# Patient Record
Sex: MALE | Race: WHITE | Employment: OTHER | URBAN - METROPOLITAN AREA
[De-identification: names, ages, dates, MRNs, and addresses within clinical notes are randomized per-mention and may not be internally consistent; named-entity substitution may affect disease eponyms.]

---

## 2020-03-27 ENCOUNTER — HOSPITAL ENCOUNTER (OUTPATIENT)
Dept: WOUND CARE | Age: 85
Discharge: HOME | End: 2020-03-27
Payer: MEDICARE

## 2020-03-27 VITALS
BODY MASS INDEX: 20.73 KG/M2 | HEIGHT: 66 IN | TEMPERATURE: 97.4 F | HEART RATE: 66 BPM | DIASTOLIC BLOOD PRESSURE: 71 MMHG | SYSTOLIC BLOOD PRESSURE: 128 MMHG | RESPIRATION RATE: 16 BRPM | WEIGHT: 129 LBS

## 2020-03-27 PROBLEM — L97.919 IDIOPATHIC CHRONIC VENOUS HYPERTENSION OF RIGHT LOWER EXTREMITY WITH ULCER (HCC): Status: ACTIVE | Noted: 2020-03-27

## 2020-03-27 PROBLEM — I87.311 IDIOPATHIC CHRONIC VENOUS HYPERTENSION OF RIGHT LOWER EXTREMITY WITH ULCER (HCC): Status: ACTIVE | Noted: 2020-03-27

## 2020-03-27 PROBLEM — R60.9 EDEMA: Status: ACTIVE | Noted: 2020-03-27

## 2020-03-27 PROCEDURE — 11042 DBRDMT SUBQ TIS 1ST 20SQCM/<: CPT

## 2020-03-27 PROCEDURE — 29581 APPL MULTLAYER CMPRN SYS LEG: CPT

## 2020-03-27 PROCEDURE — 11042 DBRDMT SUBQ TIS 1ST 20SQCM/<: CPT | Performed by: SPECIALIST

## 2020-03-27 PROCEDURE — 99203 OFFICE O/P NEW LOW 30 MIN: CPT | Performed by: SPECIALIST

## 2020-03-27 RX ORDER — GUAIFENESIN 600 MG/1
1200 TABLET, EXTENDED RELEASE ORAL 2 TIMES DAILY
COMMUNITY

## 2020-03-27 RX ORDER — SPIRONOLACTONE 25 MG/1
25 TABLET ORAL DAILY
COMMUNITY

## 2020-03-27 RX ORDER — LEVOTHYROXINE SODIUM 0.05 MG/1
50 TABLET ORAL DAILY
COMMUNITY

## 2020-03-27 RX ORDER — SIMVASTATIN 40 MG
40 TABLET ORAL NIGHTLY
COMMUNITY

## 2020-03-27 RX ORDER — VITAMIN E 268 MG
400 CAPSULE ORAL DAILY
COMMUNITY

## 2020-03-27 RX ORDER — DOCUSATE SODIUM 100 MG/1
100 CAPSULE, LIQUID FILLED ORAL 2 TIMES DAILY
COMMUNITY

## 2020-03-27 RX ORDER — FUROSEMIDE 40 MG/1
40 TABLET ORAL DAILY
COMMUNITY

## 2020-03-27 RX ORDER — ASCORBIC ACID 500 MG
500 TABLET ORAL DAILY
COMMUNITY

## 2020-03-27 RX ORDER — M-VIT,TX,IRON,MINS/CALC/FOLIC 27MG-0.4MG
1 TABLET ORAL DAILY
COMMUNITY

## 2020-03-27 RX ORDER — CLOPIDOGREL BISULFATE 75 MG/1
75 TABLET ORAL DAILY
COMMUNITY

## 2020-03-27 NOTE — PROGRESS NOTES
[address] [facility] - [place], [place]  Progress Note and Procedure Note      Liset Vincent  MEDICAL RECORD NUMBER:  [id number]  AGE: 80 y.o. GENDER: male  : 1926  EPISODE DATE:  3/27/2020      Subjective:     Chief Complaint   Patient presents with    Wound Check     right leg         HISTORY of PRESENT ILLNESS HPI     Liset Vincent is a 80 y.o. male who presents today for wound evaluation. History of Wound Context: patient referred from urgent care for wound right ankle. He woke up noticing wound several weeks ago. However, there is documentation that this wound was identified by PCP in 2019 but unclear as to how it was addressed. He denies any pain or drainage from wound. He was putting xeroform from urgent care onto wound. Patient has neuropathy both feet. Also on anticoagulants for aortic valve replacement    Wound Pain Timing/Severity: none  Quality of pain: N/A  Severity:  0 / 10   Modifying Factors: None  Associated Signs/Symptoms: edema and numbness    Wound Identification:  Wound Type: venous  Contributing Factors: edema, venous stasis and anticoagulation therapy        PAST MEDICAL HISTORY        Diagnosis Date    CAD (coronary artery disease)     Hypertension     Peripheral neuropathy     Thyroid disease        PAST SURGICAL HISTORY    Past Surgical History:   Procedure Laterality Date    CARDIAC SURGERY      heart valve       FAMILY HISTORY    History reviewed. No pertinent family history.     SOCIAL HISTORY    Social History     Tobacco Use    Smoking status: Never Smoker    Smokeless tobacco: Never Used   Substance Use Topics    Alcohol use: Yes     Comment: social    Drug use: Never       ALLERGIES    Allergies   Allergen Reactions    Sulfa Antibiotics Hives and Rash       MEDICATIONS    Current Outpatient Medications on File Prior to Encounter   Medication Sig Dispense Refill    apixaban (ELIQUIS) 2.5 MG TABS tablet Take by mouth 2 times daily      ascorbic acid (VITAMIN fibrous wound lateral right ankle. 1+ PT, DP, femoral RLE, ELENA .9          Assessment:     There is no problem list on file for this patient. Procedure Note  Indications:  Based on my examination of this patient's wound(s) today, sharp excision is required to promote healing and evaluate the extent healing. Performed by: Lisa Saavedra MD    Consent obtained: Yes    Time out taken:  Yes    Pain Control: Anesthetic  Anesthetic: 4% Lidocaine Liquid Topical       Debridement:Excisional Debridement    Using curette the wound(s) was/were sharply debrided down through and including the removal of epidermis, dermis and subcutaneous tissue.         Devitalized Tissue Debrided:  fibrin, biofilm and slough    Pre Debridement Measurements:  Are located in the Wound Documentation Flow Sheet    Wound #: 1     Post  Debridement Measurements:  Wound 03/27/20 Leg Right;Lateral #1 (Active)   Wound Image   3/27/2020 11:37 AM   Wound Venous 3/27/2020 11:37 AM   Dressing/Treatment Collagen with Ag 3/27/2020 12:03 PM   Wound Cleansed Rinsed/Irrigated with saline 3/27/2020 11:37 AM   Wound Length (cm) 1.7 cm 3/27/2020 11:37 AM   Wound Width (cm) 1 cm 3/27/2020 11:37 AM   Wound Depth (cm) 0.1 cm 3/27/2020 11:37 AM   Wound Surface Area (cm^2) 1.7 cm^2 3/27/2020 11:37 AM   Wound Volume (cm^3) 0.17 cm^3 3/27/2020 11:37 AM   Post-Procedure Length (cm) 1.8 cm 3/27/2020 12:03 PM   Post-Procedure Width (cm) 1.1 cm 3/27/2020 12:03 PM   Post-Procedure Depth (cm) 0.3 cm 3/27/2020 12:03 PM   Post-Procedure Surface Area (cm^2) 1.98 cm^2 3/27/2020 12:03 PM   Post-Procedure Volume (cm^3) 0.59 cm^3 3/27/2020 12:03 PM   Wound Assessment Slough;Red;Yellow 3/27/2020 11:37 AM   Drainage Amount Small 3/27/2020 11:37 AM   Drainage Description Yellow 3/27/2020 11:37 AM   Odor None 3/27/2020 11:37 AM   Margins Defined edges 3/27/2020 11:37 AM   Shira-wound Assessment Dry 3/27/2020 11:37 AM   Non-staged Wound Description Full thickness 3/27/2020 11:37 [] Apply around the wound: [] moisturizing lotion [] Antifungal ointment [] No-Sting barrier film [] Zinc paste [] Other:  [] Apply to affected limb:          Dressings:           Wound Location: right lower leg wound      [x] Apply Primary Dressing to wound:       [] Foam/Foam with Border(i.e Mepilex) [] Non-adherent (i.e.Mepitel)   [] Alginate with Silver (i.e. Silvercel)  [] Alginate (i.e. Aquacel)   [] Collagen (i.e. Puracol)   [x] Collagen with Silver(i.e. Catalina)     [] Hydrocolloid    [] Hydrafera Blue moistened with saline   [] MediHoney Paste/Gel   [] Hydrogel    [] Endoform      [] Santyl covered with gauze moisten with saline      [] Betadine   [] Bactroban/Mupirocin   [] Polysporin/Neosporin  [] Other:    [] Saline/Vashe \"wet to dry\" gauze     Pack wound loosely with: [] Iodoform   [] Plain Packing  [] Saline \"wet to dry\"      [x] Cover and Secure with:     [x] Dry Gauze  [] ABD  [] Cast padding  [] Kerlix or Jessica rolled gauze [] Super Absorbent (i.e. Optilock)     [] Coban  [] Ace Wrap [] Ace Wrap from forefoot to just below the knee  [] Paper Tape [] Medipore Tape [] Other:    Avoid contact of tape with skin if possible. [x] Change dressing: [] Daily    [] Every Other Day  [] Three times per week:  [] Monday, Wednesday, Friday  [] Tuesday, Thursday, Saturday   [] Once a week  [x] Do Not Change Dressing [] Other:      Multilayer Compression Wrap:  Type: 2 Layer Lite compression wrap      Applied in Clinic to the :  Right lower leg(s) on 3/27/2020    · Do not get leg(s) with wrap wet. · If wraps become too tight call the center or completely remove the wrap. · Elevate leg(s) above the level of the heart when sitting. · Avoid prolonged standing in one place. [] [address] [facility] remove wrap, cleanse affected leg(s) with soap and water, apply wound dressings as ordered above and reapply compression wraps every:        Dietary:  Important dietary reminders:  1.  Increase Protein intake (i.e. Lean meats, fish, eggs, legumes, and yogurt)  2. No added salt  3. If diabetic, follow a diabetic diet and check glucose prior to meals or as instructed by your physician. If you are still having pain after you go home:   For wounds on lower legs or arms, elevate the affected limb.  Use over-the-counter medications you would normally use for pain as permitted by your family doctor.  For persistent pain not relieved by the above interventions, please call your family doctor. Return Appointment:   DME/Wound Dressing Supplies Provided by:   o (Please call them directly to reorder supplies when you run out)   ECF or Home Healthcare:    Wound reassessment visit with Nurse :   [facility] Return Appointment: With Dr. Cornelius Ontiveros   in  [address] [place] [address]). [x] Orders placed during your visit: No orders of the defined types were placed in this encounter. Your nurse  is:  Raeann     Electronically signed by Renato Molina RN on 3/27/2020 at 12:07 PM     [facility] Information: Should you experience any significant changes in your wound(s) or have questions about your wound care, please contact the [address] [place] [address] at [phone]. Our hours vary so please leave a message. Please give us 24-48 hours to return your call. Call your doctor now or seek immediate medical care if:    · You have symptoms of infection, such as:  ? Increased pain, swelling, warmth, or redness. ? Red streaks leading from the area. ? Pus draining from the area. ? A fever. PLEASE NOTE: IF YOU ARE UNABLE TO OBTAIN WOUND SUPPLIES, CONTINUE TO USE THE SUPPLIES YOU HAVE AVAILABLE UNTIL YOU ARE ABLE TO REACH US. IT IS MOST IMPORTANT TO KEEP THE WOUND COVERED AT ALL TIMES.     Physician Signature:_______________________    Date: ___________ Time:  ____________     Physician: Dr. Cornelius Ontiveros      [] Patient unable to sign Discharge Instructions given to

## 2020-04-03 ENCOUNTER — HOSPITAL ENCOUNTER (OUTPATIENT)
Dept: WOUND CARE | Age: 85
Discharge: HOME | End: 2020-04-03
Payer: MEDICARE

## 2020-04-03 VITALS
DIASTOLIC BLOOD PRESSURE: 66 MMHG | HEART RATE: 68 BPM | TEMPERATURE: 97.6 F | SYSTOLIC BLOOD PRESSURE: 157 MMHG | RESPIRATION RATE: 18 BRPM

## 2020-04-03 PROCEDURE — 11042 DBRDMT SUBQ TIS 1ST 20SQCM/<: CPT

## 2020-04-03 PROCEDURE — 11042 DBRDMT SUBQ TIS 1ST 20SQCM/<: CPT | Performed by: SPECIALIST

## 2020-04-03 PROCEDURE — 29581 APPL MULTLAYER CMPRN SYS LEG: CPT

## 2020-04-03 PROCEDURE — 6370000000 HC RX 637 (ALT 250 FOR IP): Performed by: SPECIALIST

## 2020-04-03 RX ORDER — LIDOCAINE HYDROCHLORIDE 40 MG/ML
2.5 SOLUTION TOPICAL ONCE
Status: COMPLETED | OUTPATIENT
Start: 2020-04-03 | End: 2020-04-03

## 2020-04-03 RX ADMIN — LIDOCAINE HYDROCHLORIDE 2.5 ML: 40 SOLUTION TOPICAL at 11:14

## 2020-04-03 NOTE — PROGRESS NOTES
[address] [facility]  Progress Note and Procedure Note      Carl Juarez  MEDICAL RECORD NUMBER:  [id number]  AGE: 80 y.o. GENDER: male  : 1926  EPISODE DATE:  4/3/2020    Subjective:     Chief Complaint   Patient presents with    Wound Check     right lower leg         HISTORY of PRESENT ILLNESS HPI     Carl Juarez is a 80 y.o. male who presents today for wound/ulcer evaluation. History of Wound Context: patient referred from urgent care for wound right ankle. He woke up noticing wound several weeks ago. However, there is documentation that this wound was identified by PCP in 2019 but unclear as to how it was addressed. He denies any pain or drainage from wound. He was putting xeroform from urgent care onto wound. Patient has neuropathy both feet. Also on anticoagulants for aortic valve replacement . He has tolerated coban compression wrap. Wound Pain Timing/Severity: none    Pain Assessment:  Wound/Ulcer Pain Timing/Severity: none  Quality of pain: N/A  Severity:  0 / 10   Modifying Factors: None  Associated Signs/Symptoms: edema and numbness    Ulcer Identification:  Ulcer Type: venous  Contributing Factors: edema, venous stasis and anticoagulation therapy    Objective:      BP (!) 157/66   Pulse 68   Temp 97.6 °F (36.4 °C) (Oral)   Resp 18     Wt Readings from Last 3 Encounters:   20 129 lb (58.5 kg)       PHYSICAL EXAM    Extremities: no cyanosis, no clubbing, minimal  edema-  right lower extremity and ,    Assessment:      No diagnosis found. Procedure Note  Indications:  Based on my examination of this patient's wound(s) today, sharp excision is required to promote healing and evaluate the extent healing. Performed by: Joel Cason MD    Consent obtained?  Yes    Time out taken: Yes    Pain Control: Anesthetic: 4% Lidocaine Liquid Topical     Debridement:Excisional Debridement    Using curette the wound was sharply debrided    down through and Location: right lower leg wound     [x] Apply Primary Dressing to wound:       [] Foam/Foam with Border(i.e Mepilex) [] Non-adherent (i.e.Mepitel)   [] Alginate with Silver (i.e. Silvercel) [] Alginate (i.e. Aquacel)   [] Collagen (i.e. Puracol)   [] Collagen with Silver(i.e. Catalina)     [] Hydrocolloid    [] Hydrafera Blue moistened with saline   [] MediHoney Paste/Gel   [] Hydrogel    [] Endoform      [] Santyl covered with gauze moisten with saline     [] Betadine   [] Bactroban/Mupirocin   [] Polysporin/Neosporin  [] Other:    [] Saline/Vashe \"wet to dry\" gauze     Pack wound loosely with: [] Iodoform   [] Plain Packing  [] Other:      [x] Cover and Secure with:     [] Dry Gauze  [] ABD [] Cast padding [] Kerlix or Jessica rolled gauze [] Super Absorbent (i.e. Leotha Doan)     [] Coban  [] Ace Wrap [] Ace Wrap from forefoot to just below the knee [] Paper Tape [] Medipore Tape [] Other:    Avoid contact of tape with skin if possible. [x] Change dressing: [] Daily    [] Every Other Day  [] Three times per week:  [] Monday, Wednesday, Friday  [] Tuesday, Thursday, Saturday   [] Once a week  [] Do Not Change Dressing [] Other:         Multilayer Compression Wrap:  Type: 2 Layer Lite compression wrap      Applied in Clinic to the :  Right lower leg(s) on 4/3/2020    · Do not get leg(s) with wrap wet. · If wraps become too tight call the center or completely remove the wrap. · Elevate leg(s) above the level of the heart when sitting. · Avoid prolonged standing in one place. [] [facility] remove wrap, cleanse affected leg(s) with soap and water, apply wound dressings as ordered above and reapply compression wraps every:       Dietary:  Important dietary reminders:  1. Increase Protein intake (i.e. Lean meats, fish, eggs, legumes, and yogurt)  2. No added salt  3. If diabetic, follow a diabetic diet and check glucose prior to meals or as instructed by your physician.       If you are still

## 2020-04-13 ENCOUNTER — HOSPITAL ENCOUNTER (OUTPATIENT)
Dept: WOUND CARE | Age: 85
Discharge: HOME | End: 2020-04-13
Payer: MEDICARE

## 2020-04-13 VITALS
WEIGHT: 141.31 LBS | TEMPERATURE: 98 F | DIASTOLIC BLOOD PRESSURE: 57 MMHG | HEIGHT: 66 IN | HEART RATE: 73 BPM | SYSTOLIC BLOOD PRESSURE: 135 MMHG | BODY MASS INDEX: 22.71 KG/M2 | RESPIRATION RATE: 18 BRPM

## 2020-04-13 PROCEDURE — 99202 OFFICE O/P NEW SF 15 MIN: CPT

## 2020-04-13 PROCEDURE — 99203 OFFICE O/P NEW LOW 30 MIN: CPT | Performed by: SURGERY

## 2020-04-13 PROCEDURE — 11042 DBRDMT SUBQ TIS 1ST 20SQCM/<: CPT | Performed by: SURGERY

## 2020-04-13 PROCEDURE — 11042 DBRDMT SUBQ TIS 1ST 20SQCM/<: CPT

## 2020-04-13 RX ORDER — LIDOCAINE 50 MG/G
OINTMENT TOPICAL ONCE
Status: CANCELLED | OUTPATIENT
Start: 2020-04-13

## 2020-04-13 RX ORDER — GENTAMICIN SULFATE 1 MG/G
OINTMENT TOPICAL ONCE
Status: CANCELLED | OUTPATIENT
Start: 2020-04-13

## 2020-04-13 RX ORDER — BETAMETHASONE DIPROPIONATE 0.05 %
OINTMENT (GRAM) TOPICAL ONCE
Status: CANCELLED | OUTPATIENT
Start: 2020-04-13

## 2020-04-13 RX ORDER — GINSENG 100 MG
CAPSULE ORAL ONCE
Status: CANCELLED | OUTPATIENT
Start: 2020-04-13

## 2020-04-13 RX ORDER — BACITRACIN ZINC AND POLYMYXIN B SULFATE 500; 1000 [USP'U]/G; [USP'U]/G
OINTMENT TOPICAL ONCE
Status: CANCELLED | OUTPATIENT
Start: 2020-04-13

## 2020-04-13 RX ORDER — BACITRACIN, NEOMYCIN, POLYMYXIN B 400; 3.5; 5 [USP'U]/G; MG/G; [USP'U]/G
OINTMENT TOPICAL ONCE
Status: CANCELLED | OUTPATIENT
Start: 2020-04-13

## 2020-04-13 RX ORDER — LIDOCAINE 40 MG/G
CREAM TOPICAL ONCE
Status: CANCELLED | OUTPATIENT
Start: 2020-04-13

## 2020-04-13 RX ORDER — LIDOCAINE HYDROCHLORIDE 40 MG/ML
SOLUTION TOPICAL ONCE
Status: CANCELLED | OUTPATIENT
Start: 2020-04-13

## 2020-04-13 RX ORDER — LIDOCAINE HYDROCHLORIDE 40 MG/ML
SOLUTION TOPICAL ONCE
Status: DISCONTINUED | OUTPATIENT
Start: 2020-04-13 | End: 2020-04-14 | Stop reason: HOSPADM

## 2020-04-13 RX ORDER — CLOBETASOL PROPIONATE 0.5 MG/G
OINTMENT TOPICAL ONCE
Status: CANCELLED | OUTPATIENT
Start: 2020-04-13

## 2020-04-13 ASSESSMENT — PAIN SCALES - GENERAL: PAINLEVEL_OUTOF10: 0

## 2020-04-20 ENCOUNTER — HOSPITAL ENCOUNTER (OUTPATIENT)
Dept: WOUND CARE | Age: 85
Discharge: HOME | End: 2020-04-20
Payer: MEDICARE

## 2020-05-07 ENCOUNTER — TELEPHONE (OUTPATIENT)
Dept: WOUND CARE | Age: 85
End: 2020-05-07

## 2020-06-04 ENCOUNTER — TELEPHONE (OUTPATIENT)
Dept: WOUND CARE | Age: 85
End: 2020-06-04

## 2022-02-15 ENCOUNTER — OFFICE VISIT (OUTPATIENT)
Dept: GASTROENTEROLOGY | Facility: CLINIC | Age: 87
End: 2022-02-15

## 2022-02-15 VITALS
SYSTOLIC BLOOD PRESSURE: 100 MMHG | WEIGHT: 137 LBS | BODY MASS INDEX: 22.02 KG/M2 | HEART RATE: 87 BPM | OXYGEN SATURATION: 97 % | DIASTOLIC BLOOD PRESSURE: 64 MMHG | TEMPERATURE: 98 F | HEIGHT: 66 IN

## 2022-02-15 DIAGNOSIS — Z87.19 HISTORY OF FECAL IMPACTION: ICD-10-CM

## 2022-02-15 DIAGNOSIS — K59.00 CONSTIPATION, UNSPECIFIED CONSTIPATION TYPE: Primary | ICD-10-CM

## 2022-02-15 PROCEDURE — 99204 OFFICE O/P NEW MOD 45 MIN: CPT | Performed by: NURSE PRACTITIONER

## 2022-02-15 RX ORDER — FAMOTIDINE 20 MG/1
20 TABLET, FILM COATED ORAL 2 TIMES DAILY
COMMUNITY

## 2022-02-15 RX ORDER — CHLORAL HYDRATE 500 MG
CAPSULE ORAL
COMMUNITY

## 2022-02-15 RX ORDER — CLOPIDOGREL BISULFATE 75 MG/1
75 TABLET ORAL DAILY
COMMUNITY

## 2022-02-15 RX ORDER — MULTIPLE VITAMINS W/ MINERALS TAB 9MG-400MCG
1 TAB ORAL DAILY
COMMUNITY

## 2022-02-15 RX ORDER — FUROSEMIDE 40 MG/1
40 TABLET ORAL 2 TIMES DAILY
COMMUNITY

## 2022-02-15 RX ORDER — GUAIFENESIN 600 MG/1
1200 TABLET, EXTENDED RELEASE ORAL 2 TIMES DAILY
COMMUNITY

## 2022-02-15 RX ORDER — SPIRONOLACTONE 25 MG/1
25 TABLET ORAL DAILY
COMMUNITY

## 2022-02-15 RX ORDER — POLYETHYLENE GLYCOL 3350 17 G/17G
17 POWDER, FOR SOLUTION ORAL DAILY
COMMUNITY
Start: 2022-02-01

## 2022-02-15 RX ORDER — ASCORBIC ACID 500 MG
500 TABLET ORAL DAILY
COMMUNITY

## 2022-02-15 RX ORDER — SIMVASTATIN 40 MG
40 TABLET ORAL NIGHTLY
COMMUNITY

## 2022-02-15 RX ORDER — LEVOTHYROXINE SODIUM 0.07 MG/1
75 TABLET ORAL DAILY
COMMUNITY

## 2022-02-15 RX ORDER — VITAMIN E 268 MG
400 CAPSULE ORAL DAILY
COMMUNITY

## 2022-02-15 RX ORDER — BISACODYL 5 MG/1
5 TABLET, DELAYED RELEASE ORAL DAILY PRN
COMMUNITY

## 2022-02-15 NOTE — PROGRESS NOTES
"Chief Complaint   Patient presents with   • Constipation     fecal impaction         History of Present Illness  89-year-old male presents the office today for evaluation following ER visit at [facility] for a fecal impaction.  He was admitted from 1/28/2022 to 2/1/2022.  Abdominal x-ray revealed a moderate amount of stool in the distal colon and rectum.  Since then, the patient has been taking MiraLAX one capful twice daily, stool softener daily, Dulcolax twice daily and 1-2 enemas per day.  He reports on average of a bowel movement every 2 to 3 days. He does report having some overflow loose stool with anal leakage.  He denies any melena, hematochezia, or abdominal pain.  His weight is stable.    Review of Systems   Constitutional: Negative for fever and unexpected weight change.   HENT: Negative for trouble swallowing.    Cardiovascular: Negative for chest pain.   Gastrointestinal: Positive for constipation. Negative for abdominal distention, abdominal pain, anal bleeding, blood in stool, diarrhea, nausea, rectal pain and vomiting.      Result Review :         Vital Signs:   /64   Pulse 87   Temp 98 °F (36.7 °C)   Ht 167.6 cm (66\")   Wt 62.1 kg (137 lb)   SpO2 97%   BMI 22.11 kg/m²     Body mass index is 22.11 kg/m².     Physical Exam  Vitals reviewed.   Constitutional:       Appearance: Normal appearance.   HENT:      Head: Normocephalic.      Nose: Nose normal.   Eyes:      General: No scleral icterus.     Extraocular Movements: Extraocular movements intact.   Cardiovascular:      Rate and Rhythm: Normal rate and regular rhythm.      Pulses: Normal pulses.      Heart sounds: Normal heart sounds.   Pulmonary:      Effort: Pulmonary effort is normal. No respiratory distress.      Breath sounds: Normal breath sounds.   Abdominal:      General: Abdomen is flat. Bowel sounds are normal. There is no distension.      Palpations: Abdomen is soft. There is no mass.      Tenderness: There is no " abdominal tenderness. There is no guarding.   Musculoskeletal:      Cervical back: Normal range of motion and neck supple.      Comments: Patient ambulates with walker   Skin:     General: Skin is warm and dry.      Coloration: Skin is not jaundiced.   Neurological:      General: No focal deficit present.      Mental Status: He is alert and oriented to person, place, and time.   Psychiatric:         Mood and Affect: Mood normal.         Behavior: Behavior normal.         Thought Content: Thought content normal.         Judgment: Judgment normal.       Assessment and Plan    Diagnoses and all orders for this visit:    1. Constipation, unspecified constipation type (Primary)    2. History of fecal impaction           Patient Instructions   1.  For constipation, continue MiraLAX 1 capful twice daily.    2.  Continue stool softeners daily.    3.  We have provided you with samples of Linzess.  We will have you start with the lowest dose and take 2 capsules each morning on an empty stomach.  If this dosing regimen produces regular bowel movements, please contact our office and we will send in a prescription accordingly.  If you still continue to experience constipation on this dose of Linzess, we recommend you increase to the higher dosing at 290 mcg once daily.    4. We will plan for telephone follow up visit in 2 weeks for reassessment of symptoms.  If symptoms are severe prior to your 2-week follow-up, please contact the office for further recommendations.     Discussion:    We have provided this patient with samples of Linzess 72 mcg and 290 mcg.  We did not have any samples at the 145 mcg dosing.  We have instructed the patient to start two 72 mcg  tablets each morning on empty stomach with his current bowel regimen to see how his bowel habits respond.  We have also provided him with the higher strength Linzess if the lower dose is ineffective.  His niece accompanied him today on his office visit.  We will plan for  telephone follow-up in 2 weeks to reassess symptoms and to determine if we are able to eliminate some of the other OTC medications he is currently taking to assist with constipation.  Both the patient and his niece verbalized understanding of above plan of care and are in agreement.  All questions answered and support provided.    EMR Dragon/Transcription Disclaimer:  This document has been Dictated utilizing Dragon dictation.

## 2022-02-15 NOTE — PATIENT INSTRUCTIONS
1.  For constipation, continue MiraLAX 1 capful twice daily.    2.  Continue stool softeners daily.    3.  We have provided you with samples of Linzess.  We will have you start with the lowest dose and take 2 capsules each morning on an empty stomach.  If this dosing regimen produces regular bowel movements, please contact our office and we will send in a prescription accordingly.  If you still continue to experience constipation on this dose of Linzess, we recommend you increase to the higher dosing at 290 mcg once daily.    4. We will plan for telephone follow up visit in 2 weeks for reassessment of symptoms.  If symptoms are severe prior to your 2-week follow-up, please contact the office for further recommendations.

## 2022-03-02 ENCOUNTER — OFFICE VISIT (OUTPATIENT)
Dept: GASTROENTEROLOGY | Facility: CLINIC | Age: 87
End: 2022-03-02

## 2022-03-02 DIAGNOSIS — K21.9 GASTROESOPHAGEAL REFLUX DISEASE, UNSPECIFIED WHETHER ESOPHAGITIS PRESENT: Chronic | ICD-10-CM

## 2022-03-02 DIAGNOSIS — Z87.19 HISTORY OF FECAL IMPACTION: ICD-10-CM

## 2022-03-02 DIAGNOSIS — K59.00 CONSTIPATION, UNSPECIFIED CONSTIPATION TYPE: Primary | Chronic | ICD-10-CM

## 2022-03-02 PROCEDURE — 99443 PR PHYS/QHP TELEPHONE EVALUATION 21-30 MIN: CPT | Performed by: NURSE PRACTITIONER

## 2022-03-02 RX ORDER — LUBIPROSTONE 24 UG/1
24 CAPSULE ORAL 2 TIMES DAILY WITH MEALS
Qty: 180 CAPSULE | Refills: 3 | Status: SHIPPED | OUTPATIENT
Start: 2022-03-02 | End: 2022-03-04

## 2022-03-02 RX ORDER — LORATADINE 10 MG/1
10 TABLET ORAL DAILY
COMMUNITY

## 2022-03-02 RX ORDER — PSEUDOEPHEDRINE HCL 30 MG
100 TABLET ORAL
COMMUNITY

## 2022-03-02 NOTE — PROGRESS NOTES
Chief Complaint   Patient presents with   • Follow-up     constipation         History of Present Illness  89-year-old male presents today for telephone follow-up.  He was last seen in office on 2/1/2022.  He had a visit to the ER at [facility] for a fecal impaction and underwent admission from 1/28/2022 to 2/1/2022.  His abdominal x-ray demonstrated moderate amount of stool in the distal colon and rectum.  He has a history of constipation, fecal impaction, and GERD.    At his last office visit we had recommended that he continue MiraLAX 1 capful twice daily, a daily stool softener and we also provided him with samples of Linzess at different values to determine which worked best to manage his symptoms.  He tried the Linzess at all dosing strengths.  Once he had reached the 290 mcg strength daily he reported having some loose stool initially with 2-3 bowel movements on the first day, and then states that he would have just a very small, loose stool daily thereafter.  He did not feel that he fully emptied.    With the regimen of MiraLAX, Dulcolax, and stool softeners he would report that his bowel movements were irregular.  Some days he would have no bowel movement and other days he may have 1-2 bowel movements.  He does not currently take a fiber supplement.  He denies any abdominal or rectal pain.  He denies any melena or hematochezia.    For GERD he continues famotidine 20 mg once daily with good control of symptoms.    You have chosen to receive care through a telephone visit.   Do you consent to use a telephone visit for your medical care today? Yes    Review of Systems   Constitutional: Negative for fever and unexpected weight change.   HENT: Negative for trouble swallowing.    Cardiovascular: Negative for chest pain.   Gastrointestinal: Positive for constipation. Negative for abdominal distention, abdominal pain, anal bleeding, blood in stool, diarrhea, nausea, rectal pain and vomiting.      Result  Review :      COMPREHENSIVE METABOLIC PANEL (02/01/2022 07:32)  Office Visit with Dolores Colvin APRN (02/15/2022)    Assessment and Plan    Diagnoses and all orders for this visit:    1. Constipation, unspecified constipation type (Primary)    2. History of fecal impaction    3. Gastroesophageal reflux disease, unspecified whether esophagitis present    Other orders  -     lubiprostone (Amitiza) 24 MCG capsule; Take 1 capsule by mouth 2 (Two) Times a Day With Meals.  Dispense: 180 capsule; Refill: 3         This visit has been rescheduled as a phone visit to comply with patient safety concerns in accordance with CDC recommendations. Total time of discussion was 27 minutes.    Patient Instructions   1.  For constipation we will have you continue MiraLAX 1 capful twice daily, stool softeners daily, and Duca locks 2 times daily.    2.  Discontinue Linzess.    3.  We will have you start Amitiza 24 mcg twice daily.  You will take 1 tablet with food each morning and each evening.  This prescription has been sent to [facility] mail order pharmacy.    4.  We will plan for telephone follow-up visit in 4 weeks on 3/30/2022 at 10:15 AM for reassessment of symptoms, or sooner should symptoms worsen or fail to improve.     Discussion:    We will continue patient on MiraLAX, stool softeners, and Dulcolax.  As he has tried and failed Linzess at the highest dosing, we will proceed with use of Amitiza 24 mcg twice daily and have sent this prescription to his pharmacy.  Discussion was also held regarding potential utility of fiber supplementation.  However, the patient is very concerned about his bowel habits, and at this time would like more of an aggressive type of therapy.  We will plan for telephone follow-up in 4 weeks for reassessment of symptoms, or sooner should symptoms worsen or fail to improve.      Second phone call was placed to patient's niece, IRIS Diaz (HIPAA form) and plan of care was also discussed  with her as well.  Patient's niece was hoping for a three-way phone call today for the visit; however, this was not performed and a second call had to be made to discuss his plan of care.  Patient has requested that we mail him a copy of his visit summary so that he can follow the plan of care.  Patient is in agreement with plan of care.  All questions answered and support provided.    EMR Dragon/Transcription Disclaimer:  This document has been Dictated utilizing Dragon dictation.

## 2022-03-02 NOTE — PATIENT INSTRUCTIONS
1.  For constipation we will have you continue MiraLAX 1 capful twice daily, stool softeners daily, and Duca locks 2 times daily.    2.  Discontinue Linzess.    3.  We will have you start Amitiza 24 mcg twice daily.  You will take 1 tablet with food each morning and each evening.  This prescription has been sent to [facility] mail order pharmacy.    4.  We will plan for telephone follow-up visit in 4 weeks on 3/30/2022 at 10:15 AM for reassessment of symptoms, or sooner should symptoms worsen or fail to improve.

## 2022-03-04 ENCOUNTER — TELEPHONE (OUTPATIENT)
Dept: GASTROENTEROLOGY | Facility: CLINIC | Age: 87
End: 2022-03-04

## 2022-03-04 RX ORDER — PRUCALOPRIDE 2 MG/1
2 TABLET, FILM COATED ORAL DAILY
Qty: 30 TABLET | Refills: 5 | Status: SHIPPED | OUTPATIENT
Start: 2022-03-04 | End: 2022-03-14 | Stop reason: SDUPTHER

## 2022-03-07 ENCOUNTER — TELEPHONE (OUTPATIENT)
Dept: GASTROENTEROLOGY | Facility: CLINIC | Age: 87
End: 2022-03-07

## 2022-03-07 NOTE — TELEPHONE ENCOUNTER
Patient requesting samples of Motegrity as his RX was sent to mail order pharmacy and may take a bit to arrive.  Gave two weeks worth of samples and informed patient's great niece they were here - ok per SW

## 2022-03-14 ENCOUNTER — HOSPITAL ENCOUNTER (OUTPATIENT)
Dept: GENERAL RADIOLOGY | Facility: HOSPITAL | Age: 87
Discharge: HOME | End: 2022-03-14
Admitting: INTERNAL MEDICINE

## 2022-03-14 ENCOUNTER — OFFICE VISIT (OUTPATIENT)
Dept: GASTROENTEROLOGY | Facility: CLINIC | Age: 87
End: 2022-03-14

## 2022-03-14 VITALS
SYSTOLIC BLOOD PRESSURE: 110 MMHG | DIASTOLIC BLOOD PRESSURE: 64 MMHG | OXYGEN SATURATION: 97 % | HEART RATE: 80 BPM | WEIGHT: 129.3 LBS | HEIGHT: 66 IN | TEMPERATURE: 97.4 F | BODY MASS INDEX: 20.78 KG/M2

## 2022-03-14 DIAGNOSIS — K59.09 OTHER CONSTIPATION: ICD-10-CM

## 2022-03-14 DIAGNOSIS — K56.41 FECAL IMPACTION OF RECTUM: ICD-10-CM

## 2022-03-14 DIAGNOSIS — K59.09 OTHER CONSTIPATION: Primary | ICD-10-CM

## 2022-03-14 PROCEDURE — 99214 OFFICE O/P EST MOD 30 MIN: CPT | Performed by: INTERNAL MEDICINE

## 2022-03-14 PROCEDURE — 74018 RADEX ABDOMEN 1 VIEW: CPT

## 2022-03-14 RX ORDER — CLOPIDOGREL BISULFATE 75 MG/1
1 TABLET ORAL DAILY
COMMUNITY
Start: 2022-03-09 | End: 2022-03-14

## 2022-03-14 RX ORDER — PRUCALOPRIDE 2 MG/1
2 TABLET, FILM COATED ORAL DAILY
Qty: 90 TABLET | Refills: 3 | Status: SHIPPED | OUTPATIENT
Start: 2022-03-14

## 2022-03-14 NOTE — PROGRESS NOTES
"Chief Complaint   Patient presents with   • Constipation         History of Present Illness  89-year-old male presents today for constipation.  Last office visit March 2, 2022 for follow-up after ER visit with constipation.  Smog enema was given in the emergency department with good output.  Imaging was obtained with moderate constipation with stool in the distal colon and rectum, nonspecific bowel pattern, degenerative changes.  Patient also has a history of hospitalization with fecal impaction.    Previous treatments include Linzess, MiraLAX 1 capful twice daily, stool softeners daily, Dulcolax twice daily and enemas 1-2 times per day.    He has most currently been prescribed Motegrity. He has stringy, mucousy, thin stools. He is not using enemas at this time.     Review of Systems     Result Review :           Vital Signs:   /64   Pulse 80   Temp 97.4 °F (36.3 °C)   Ht 167.6 cm (66\")   Wt 58.7 kg (129 lb 4.8 oz)   SpO2 97%   BMI 20.87 kg/m²     Body mass index is 20.87 kg/m².     Physical Exam  Vitals reviewed.   Constitutional:       Appearance: Normal appearance.   Abdominal:      General: Bowel sounds are normal. There is distension.      Palpations: Abdomen is soft. Abdomen is not rigid. There is no mass or pulsatile mass.      Tenderness: There is no abdominal tenderness. There is no guarding or rebound.      Comments: mild           Assessment and Plan    Diagnoses and all orders for this visit:    1. Other constipation (Primary)  -     XR Abdomen 1 View; Future  -     Prucalopride Succinate (Motegrity) 2 MG tablet; Take 1 tablet by mouth Daily.  Dispense: 90 tablet; Refill: 3    2. Fecal impaction of rectum (HCC)  -     XR Abdomen 1 View; Future  -     Prucalopride Succinate (Motegrity) 2 MG tablet; Take 1 tablet by mouth Daily.  Dispense: 90 tablet; Refill: 3         Patient, recent hospitalization with fecal impaction and ongoing difficulty with constipation.  Discussed to goals which are to " prevent impaction with associated illness and increase daily quality of life with a bowel regimen that is tolerable and the least amount of invasiveness possible to keep bowel movements regular.    Will obtain abdominal x-ray to assess for stool burden, patient has been on oral regimen only for the past several weeks.    Based on abdominal x-ray, will make recommendations which may include enemas on a regular basis or semiregular basis or as needed, again our goal is to avoid invasive bowel regimen but help patient obtain a bowel regimen that provides adequate and acceptable quality of life.    We will make additional recommendations based on abdominal x-ray results.      I have reviewed and confirmed the accuracy of the HPI and Assessment and Plan as documented by the APRN ROBERTO Monte

## 2022-03-15 ENCOUNTER — PATIENT MESSAGE (OUTPATIENT)
Dept: GASTROENTEROLOGY | Facility: CLINIC | Age: 87
End: 2022-03-15

## 2022-03-15 DIAGNOSIS — K56.41 FECAL IMPACTION OF RECTUM: Primary | ICD-10-CM

## 2022-03-15 DIAGNOSIS — Z87.19 HISTORY OF FECAL IMPACTION: ICD-10-CM

## 2022-03-15 DIAGNOSIS — K59.09 OTHER CONSTIPATION: ICD-10-CM

## 2022-03-16 RX ORDER — LACTULOSE 10 G/15ML
20 SOLUTION ORAL 2 TIMES DAILY
Qty: 1800 ML | Refills: 1 | Status: SHIPPED | OUTPATIENT
Start: 2022-03-16

## 2022-03-16 NOTE — TELEPHONE ENCOUNTER
From: Jonnathan Trejo  To: ROBERTO Monte  Sent: 3/15/2022 4:18 PM EDT  Subject: Jonnathan Trejo medication    Hi Jonnathan Lowery spoke with [facility] and they told him the Motegrity perscription is $200+ for 30 days. Is there another medication that you can prescribe that is a tier reduction and less expensive.     Thanks...Kyara

## 2023-10-24 ENCOUNTER — APPOINTMENT (OUTPATIENT)
Dept: GENERAL RADIOLOGY | Facility: HOSPITAL | Age: 88
End: 2023-10-24
Payer: MEDICARE

## 2023-10-24 ENCOUNTER — HOSPITAL ENCOUNTER (OUTPATIENT)
Facility: HOSPITAL | Age: 88
Setting detail: OBSERVATION
Discharge: HOME | End: 2023-10-24
Attending: EMERGENCY MEDICINE | Admitting: EMERGENCY MEDICINE
Payer: MEDICARE

## 2023-10-24 ENCOUNTER — APPOINTMENT (OUTPATIENT)
Dept: MRI IMAGING | Facility: HOSPITAL | Age: 88
End: 2023-10-24
Payer: MEDICARE

## 2023-10-24 ENCOUNTER — APPOINTMENT (OUTPATIENT)
Dept: CT IMAGING | Facility: HOSPITAL | Age: 88
End: 2023-10-24
Payer: MEDICARE

## 2023-10-24 VITALS
BODY MASS INDEX: 21.53 KG/M2 | WEIGHT: 134 LBS | TEMPERATURE: 97.5 F | HEIGHT: 66 IN | DIASTOLIC BLOOD PRESSURE: 66 MMHG | RESPIRATION RATE: 18 BRPM | OXYGEN SATURATION: 100 % | HEART RATE: 74 BPM | SYSTOLIC BLOOD PRESSURE: 143 MMHG

## 2023-10-24 DIAGNOSIS — S09.90XA MINOR HEAD INJURY, INITIAL ENCOUNTER: ICD-10-CM

## 2023-10-24 DIAGNOSIS — R93.7 ABNORMAL COMPUTED TOMOGRAPHY OF CERVICAL SPINE: ICD-10-CM

## 2023-10-24 DIAGNOSIS — S01.01XA LACERATION OF SCALP, INITIAL ENCOUNTER: Primary | ICD-10-CM

## 2023-10-24 PROBLEM — S12.110K ODONTOID FRACTURE WITH NONUNION: Status: ACTIVE | Noted: 2023-10-24
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
## 2023-10-24 LAB
ALBUMIN SERPL-MCNC: 4 G/DL (ref 3.5–5.2)
ALBUMIN/GLOB SERPL: 1.4 G/DL
ALP SERPL-CCNC: 92 U/L (ref 39–117)
ALT SERPL W P-5'-P-CCNC: 33 U/L (ref 1–41)
ANION GAP SERPL CALCULATED.3IONS-SCNC: 9.4 MMOL/L (ref 5–15)
AST SERPL-CCNC: 32 U/L (ref 1–40)
BASOPHILS # BLD AUTO: 0.03 10*3/MM3 (ref 0–0.2)
BASOPHILS NFR BLD AUTO: 0.4 % (ref 0–1.5)
BILIRUB SERPL-MCNC: 0.4 MG/DL (ref 0–1.2)
BUN SERPL-MCNC: 14 MG/DL (ref 8–23)
BUN/CREAT SERPL: 20.6 (ref 7–25)
CALCIUM SPEC-SCNC: 9.4 MG/DL (ref 8.2–9.6)
CHLORIDE SERPL-SCNC: 99 MMOL/L (ref 98–107)
CO2 SERPL-SCNC: 23.6 MMOL/L (ref 22–29)
CREAT SERPL-MCNC: 0.68 MG/DL (ref 0.76–1.27)
DEPRECATED RDW RBC AUTO: 48.5 FL (ref 37–54)
EGFRCR SERPLBLD CKD-EPI 2021: 84.5 ML/MIN/1.73
EOSINOPHIL # BLD AUTO: 0.11 10*3/MM3 (ref 0–0.4)
EOSINOPHIL NFR BLD AUTO: 1.3 % (ref 0.3–6.2)
ERYTHROCYTE [DISTWIDTH] IN BLOOD BY AUTOMATED COUNT: 14.2 % (ref 12.3–15.4)
GLOBULIN UR ELPH-MCNC: 2.9 GM/DL
GLUCOSE SERPL-MCNC: 105 MG/DL (ref 65–99)
HCT VFR BLD AUTO: 36.2 % (ref 37.5–51)
HGB BLD-MCNC: 12 G/DL (ref 13–17.7)
IMM GRANULOCYTES # BLD AUTO: 0.02 10*3/MM3 (ref 0–0.05)
IMM GRANULOCYTES NFR BLD AUTO: 0.2 % (ref 0–0.5)
INR PPP: 1.18 (ref 0.9–1.1)
LYMPHOCYTES # BLD AUTO: 1.2 10*3/MM3 (ref 0.7–3.1)
LYMPHOCYTES NFR BLD AUTO: 14.6 % (ref 19.6–45.3)
MCH RBC QN AUTO: 30.9 PG (ref 26.6–33)
MCHC RBC AUTO-ENTMCNC: 33.1 G/DL (ref 31.5–35.7)
MCV RBC AUTO: 93.3 FL (ref 79–97)
MONOCYTES # BLD AUTO: 1.02 10*3/MM3 (ref 0.1–0.9)
MONOCYTES NFR BLD AUTO: 12.4 % (ref 5–12)
NEUTROPHILS NFR BLD AUTO: 5.85 10*3/MM3 (ref 1.7–7)
NEUTROPHILS NFR BLD AUTO: 71.1 % (ref 42.7–76)
NRBC BLD AUTO-RTO: 0 /100 WBC (ref 0–0.2)
PLATELET # BLD AUTO: 232 10*3/MM3 (ref 140–450)
PMV BLD AUTO: 9.8 FL (ref 6–12)
POTASSIUM SERPL-SCNC: 4.4 MMOL/L (ref 3.5–5.2)
PROT SERPL-MCNC: 6.9 G/DL (ref 6–8.5)
PROTHROMBIN TIME: 15.1 SECONDS (ref 11.7–14.2)
RBC # BLD AUTO: 3.88 10*6/MM3 (ref 4.14–5.8)
SODIUM SERPL-SCNC: 132 MMOL/L (ref 136–145)
WBC NRBC COR # BLD: 8.23 10*3/MM3 (ref 3.4–10.8)

## 2023-10-24 PROCEDURE — 85025 COMPLETE CBC W/AUTO DIFF WBC: CPT | Performed by: PHYSICIAN ASSISTANT

## 2023-10-24 PROCEDURE — G0378 HOSPITAL OBSERVATION PER HR: HCPCS

## 2023-10-24 PROCEDURE — 71045 X-RAY EXAM CHEST 1 VIEW: CPT

## 2023-10-24 PROCEDURE — 80053 COMPREHEN METABOLIC PANEL: CPT | Performed by: PHYSICIAN ASSISTANT

## 2023-10-24 PROCEDURE — 90715 TDAP VACCINE 7 YRS/> IM: CPT | Performed by: NURSE PRACTITIONER

## 2023-10-24 PROCEDURE — 85610 PROTHROMBIN TIME: CPT | Performed by: PHYSICIAN ASSISTANT

## 2023-10-24 PROCEDURE — 90471 IMMUNIZATION ADMIN: CPT | Performed by: NURSE PRACTITIONER

## 2023-10-24 PROCEDURE — 25010000002 TETANUS-DIPHTH-ACELL PERTUSSIS 5-2.5-18.5 LF-MCG/0.5 SUSPENSION PREFILLED SYRINGE: Performed by: NURSE PRACTITIONER

## 2023-10-24 PROCEDURE — 99214 OFFICE O/P EST MOD 30 MIN: CPT

## 2023-10-24 PROCEDURE — 99284 EMERGENCY DEPT VISIT MOD MDM: CPT

## 2023-10-24 PROCEDURE — 72141 MRI NECK SPINE W/O DYE: CPT

## 2023-10-24 PROCEDURE — 70450 CT HEAD/BRAIN W/O DYE: CPT

## 2023-10-24 PROCEDURE — 72125 CT NECK SPINE W/O DYE: CPT

## 2023-10-24 PROCEDURE — 72040 X-RAY EXAM NECK SPINE 2-3 VW: CPT

## 2023-10-24 RX ORDER — PSYLLIUM HUSK 0.52 G/1
0.52 CAPSULE ORAL DAILY
COMMUNITY

## 2023-10-24 RX ORDER — ZOLPIDEM TARTRATE 5 MG/1
5 TABLET ORAL NIGHTLY
COMMUNITY

## 2023-10-24 RX ORDER — ACETAMINOPHEN 325 MG/1
650 TABLET ORAL EVERY 4 HOURS PRN
Status: DISCONTINUED | OUTPATIENT
Start: 2023-10-24 | End: 2023-10-24 | Stop reason: HOSPADM

## 2023-10-24 RX ORDER — OMEPRAZOLE 40 MG/1
40 CAPSULE, DELAYED RELEASE ORAL DAILY
COMMUNITY

## 2023-10-24 RX ORDER — SODIUM CHLORIDE 0.9 % (FLUSH) 0.9 %
10 SYRINGE (ML) INJECTION EVERY 12 HOURS SCHEDULED
Status: DISCONTINUED | OUTPATIENT
Start: 2023-10-24 | End: 2023-10-24 | Stop reason: HOSPADM

## 2023-10-24 RX ORDER — SODIUM CHLORIDE 9 MG/ML
40 INJECTION, SOLUTION INTRAVENOUS AS NEEDED
Status: DISCONTINUED | OUTPATIENT
Start: 2023-10-24 | End: 2023-10-24 | Stop reason: HOSPADM

## 2023-10-24 RX ORDER — METOPROLOL SUCCINATE 50 MG/1
75 TABLET, EXTENDED RELEASE ORAL DAILY
COMMUNITY

## 2023-10-24 RX ORDER — AMOXICILLIN 250 MG
2 CAPSULE ORAL 2 TIMES DAILY
Status: DISCONTINUED | OUTPATIENT
Start: 2023-10-24 | End: 2023-10-24 | Stop reason: HOSPADM

## 2023-10-24 RX ORDER — BISACODYL 10 MG
10 SUPPOSITORY, RECTAL RECTAL DAILY PRN
Status: DISCONTINUED | OUTPATIENT
Start: 2023-10-24 | End: 2023-10-24 | Stop reason: HOSPADM

## 2023-10-24 RX ORDER — DIGOXIN 125 MCG
125 TABLET ORAL
COMMUNITY

## 2023-10-24 RX ORDER — POLYETHYLENE GLYCOL 3350 17 G/17G
17 POWDER, FOR SOLUTION ORAL DAILY PRN
Status: DISCONTINUED | OUTPATIENT
Start: 2023-10-24 | End: 2023-10-24 | Stop reason: HOSPADM

## 2023-10-24 RX ORDER — LEVOTHYROXINE SODIUM 0.1 MG/1
100 TABLET ORAL DAILY
COMMUNITY

## 2023-10-24 RX ORDER — FLUTICASONE PROPIONATE 50 MCG
2 SPRAY, SUSPENSION (ML) NASAL DAILY
COMMUNITY

## 2023-10-24 RX ORDER — SODIUM CHLORIDE 0.9 % (FLUSH) 0.9 %
10 SYRINGE (ML) INJECTION AS NEEDED
Status: DISCONTINUED | OUTPATIENT
Start: 2023-10-24 | End: 2023-10-24 | Stop reason: HOSPADM

## 2023-10-24 RX ORDER — BISACODYL 10 MG
10 SUPPOSITORY, RECTAL RECTAL DAILY
COMMUNITY

## 2023-10-24 RX ORDER — BISACODYL 5 MG/1
5 TABLET, DELAYED RELEASE ORAL DAILY PRN
Status: DISCONTINUED | OUTPATIENT
Start: 2023-10-24 | End: 2023-10-24 | Stop reason: HOSPADM

## 2023-10-24 RX ORDER — BENZONATATE 100 MG/1
100 CAPSULE ORAL 2 TIMES DAILY
COMMUNITY

## 2023-10-24 RX ORDER — HYDROCODONE BITARTRATE AND ACETAMINOPHEN 5; 325 MG/1; MG/1
1 TABLET ORAL EVERY 6 HOURS PRN
COMMUNITY

## 2023-10-24 RX ORDER — SENNOSIDES 8.6 MG
650 CAPSULE ORAL EVERY 6 HOURS PRN
COMMUNITY

## 2023-10-24 RX ADMIN — TETANUS TOXOID, REDUCED DIPHTHERIA TOXOID AND ACELLULAR PERTUSSIS VACCINE, ADSORBED 0.5 ML: 5; 2.5; 8; 8; 2.5 SUSPENSION INTRAMUSCULAR at 09:35

## 2023-10-24 NOTE — CONSULTS
[facility], [place] NEUROSURGERY CONSULT NOTE    Patient name: Jonnathan Trejo  Referring Provider: Shanna Guzman APRN   Reason for Consultation: Recent fall with head injury, odontoid fracture    Patient Care Team:  Rigoberto Camejo MD as PCP - General (Internal Medicine)  Dolores Colvin APRN as Nurse Practitioner (Gastroenterology)  Sarabjit Schulz MD as Consulting Physician (Gastroenterology)    Chief complaint: Fall with head injury    Subjective .     History of present illness:    Patient is a 89 y.o.  male with neuropathy, chronic use of walker for ambulation who states that he fell last night, and hit his head.  EMS was called and he was taken to the emergency department.  He states that he has had a few falls in the past few weeks related to chronic knee issues.  He reports taking Eliquis daily for a valve replacement that he had reportedly 5 to 7 years ago.  He denies any vision change, confusion, upper extremity pain, weakness.  He does have a history of neuropathy which she reports baseline numbness and hands and feet.  He denies any new bowel or bladder issues and saddle anesthesia.  He also denies any neck pain, stiffness or decreased range of motion.    Review of Systems  Review of Systems   Constitutional:  Negative for activity change.   Eyes:  Negative for visual disturbance.   Gastrointestinal:         No new bowel issues.  Has chronic constipation   Genitourinary:         Patient has intermittent baseline urge incontinence.  This is not changed   Neurological:  Positive for numbness (Baseline neuropathy in hands and feet). Negative for weakness.   Psychiatric/Behavioral:  Negative for confusion.        History  PAST MEDICAL HISTORY  Past Medical History:   Diagnosis Date    Atrial fibrillation     Bowel trouble     Congestive heart failure     Constipation     Disease of thyroid gland     GERD (gastroesophageal reflux disease)     History of heart valve replacement     Hyperlipidemia        PAST  SURGICAL HISTORY  Past Surgical History:   Procedure Laterality Date    CARDIAC VALVE REPLACEMENT      COLONOSCOPY      EXPLORATORY LAPAROTOMY      UPPER GASTROINTESTINAL ENDOSCOPY         FAMILY HISTORY  Family History   Problem Relation Age of Onset    Colon cancer Brother     Colon polyps Brother     Ulcerative colitis Brother     Irritable bowel syndrome Niece     Crohn's disease Neg Hx        SOCIAL HISTORY  Social History     Tobacco Use    Smoking status: Never    Smokeless tobacco: Never   Vaping Use    Vaping Use: Never used   Substance Use Topics    Alcohol use: Yes     Comment: occasional    Drug use: Never         Allergies:  Sulfa antibiotics    MEDICATIONS:  Medications Prior to Admission   Medication Sig Dispense Refill Last Dose    acetaminophen (TYLENOL) 650 MG 8 hr tablet Take 1 tablet by mouth Every 6 (Six) Hours As Needed for Mild Pain.       apixaban (ELIQUIS) 2.5 MG tablet tablet Take 1 tablet by mouth 2 (Two) Times a Day.   10/23/2023    benzonatate (TESSALON) 100 MG capsule Take 1 capsule by mouth 2 (Two) Times a Day.       bisacodyl (Dulcolax) 10 MG suppository Insert 1 suppository into the rectum Daily.       bisacodyl (DULCOLAX) 5 MG EC tablet Take 1 tablet by mouth Daily As Needed for Constipation.   10/23/2023    clopidogrel (PLAVIX) 75 MG tablet Take 1 tablet by mouth Daily.   10/23/2023    digoxin (LANOXIN) 125 MCG tablet Take 1 tablet by mouth Daily.       docusate sodium 100 MG capsule Take 1 capsule by mouth.   10/23/2023    fluticasone (FLONASE) 50 MCG/ACT nasal spray 2 sprays into the nostril(s) as directed by provider Daily.       guaiFENesin (MUCINEX) 600 MG 12 hr tablet Take 2 tablets by mouth 2 (Two) Times a Day.       HYDROcodone-acetaminophen (NORCO) 5-325 MG per tablet Take 1 tablet by mouth Every 6 (Six) Hours As Needed for Moderate Pain.       levothyroxine (SYNTHROID, LEVOTHROID) 100 MCG tablet Take 1 tablet by mouth Daily.       loratadine (CLARITIN) 10 MG tablet Take 1  tablet by mouth Daily.       metoprolol succinate XL (TOPROL-XL) 50 MG 24 hr tablet Take 1.5 tablets by mouth Daily.   10/23/2023    omeprazole (priLOSEC) 40 MG capsule Take 1 capsule by mouth Daily.       Prucalopride Succinate (Motegrity) 2 MG tablet Take 1 tablet by mouth Daily. 90 tablet 3     psyllium (QC Fiber Laxative) 0.52 g capsule Take 1 capsule by mouth Daily.       simvastatin (ZOCOR) 40 MG tablet Take 1 tablet by mouth Every Night.       spironolactone (ALDACTONE) 25 MG tablet Take 1 tablet by mouth Daily.       ascorbic acid (VITAMIN C) 500 MG tablet Take 500 mg by mouth Daily.       furosemide (LASIX) 40 MG tablet Take 40 mg by mouth 2 (Two) Times a Day.       polyethylene glycol (MIRALAX) 17 GM/SCOOP powder Take 17 g by mouth Daily.       zolpidem (AMBIEN) 5 MG tablet Take 1 tablet by mouth Every Night.          Objective     Results Review:  LABS:    Admission on 10/24/2023   Component Date Value Ref Range Status    Glucose 10/24/2023 105 (H)  65 - 99 mg/dL Final    BUN 10/24/2023 14  8 - 23 mg/dL Final    Creatinine 10/24/2023 0.68 (L)  0.76 - 1.27 mg/dL Final    Sodium 10/24/2023 132 (L)  136 - 145 mmol/L Final    Potassium 10/24/2023 4.4  3.5 - 5.2 mmol/L Final    Chloride 10/24/2023 99  98 - 107 mmol/L Final    CO2 10/24/2023 23.6  22.0 - 29.0 mmol/L Final    Calcium 10/24/2023 9.4  8.2 - 9.6 mg/dL Final    Total Protein 10/24/2023 6.9  6.0 - 8.5 g/dL Final    Albumin 10/24/2023 4.0  3.5 - 5.2 g/dL Final    ALT (SGPT) 10/24/2023 33  1 - 41 U/L Final    AST (SGOT) 10/24/2023 32  1 - 40 U/L Final    Alkaline Phosphatase 10/24/2023 92  39 - 117 U/L Final    Total Bilirubin 10/24/2023 0.4  0.0 - 1.2 mg/dL Final    Globulin 10/24/2023 2.9  gm/dL Final    A/G Ratio 10/24/2023 1.4  g/dL Final    BUN/Creatinine Ratio 10/24/2023 20.6  7.0 - 25.0 Final    Anion Gap 10/24/2023 9.4  5.0 - 15.0 mmol/L Final    eGFR 10/24/2023 84.5  >60.0 mL/min/1.73 Final    Protime 10/24/2023 15.1 (H)  11.7 - 14.2 Seconds  Final    INR 10/24/2023 1.18 (H)  0.90 - 1.10 Final    WBC 10/24/2023 8.23  3.40 - 10.80 10*3/mm3 Final    RBC 10/24/2023 3.88 (L)  4.14 - 5.80 10*6/mm3 Final    Hemoglobin 10/24/2023 12.0 (L)  13.0 - 17.7 g/dL Final    Hematocrit 10/24/2023 36.2 (L)  37.5 - 51.0 % Final    MCV 10/24/2023 93.3  79.0 - 97.0 fL Final    MCH 10/24/2023 30.9  26.6 - 33.0 pg Final    MCHC 10/24/2023 33.1  31.5 - 35.7 g/dL Final    RDW 10/24/2023 14.2  12.3 - 15.4 % Final    RDW-SD 10/24/2023 48.5  37.0 - 54.0 fl Final    MPV 10/24/2023 9.8  6.0 - 12.0 fL Final    Platelets 10/24/2023 232  140 - 450 10*3/mm3 Final    Neutrophil % 10/24/2023 71.1  42.7 - 76.0 % Final    Lymphocyte % 10/24/2023 14.6 (L)  19.6 - 45.3 % Final    Monocyte % 10/24/2023 12.4 (H)  5.0 - 12.0 % Final    Eosinophil % 10/24/2023 1.3  0.3 - 6.2 % Final    Basophil % 10/24/2023 0.4  0.0 - 1.5 % Final    Immature Grans % 10/24/2023 0.2  0.0 - 0.5 % Final    Neutrophils, Absolute 10/24/2023 5.85  1.70 - 7.00 10*3/mm3 Final    Lymphocytes, Absolute 10/24/2023 1.20  0.70 - 3.10 10*3/mm3 Final    Monocytes, Absolute 10/24/2023 1.02 (H)  0.10 - 0.90 10*3/mm3 Final    Eosinophils, Absolute 10/24/2023 0.11  0.00 - 0.40 10*3/mm3 Final    Basophils, Absolute 10/24/2023 0.03  0.00 - 0.20 10*3/mm3 Final    Immature Grans, Absolute 10/24/2023 0.02  0.00 - 0.05 10*3/mm3 Final    nRBC 10/24/2023 0.0  0.0 - 0.2 /100 WBC Final       DIAGNOSTICS:  CT head without contrast 10/24/2023:  FINDINGS:        No acute intracranial hemorrhage, midline shift or mass effect. No acute  territorial infarct is identified.     Moderate to prominent periventricular hypodensities suggest chronic  small vessel ischemic change in a patient this age.     Arterial calcifications are seen at the base of the brain.     Ventricles, cisterns, cerebral sulci are unremarkable for patient age.     Likely mucous retention cyst in right maxillary sinus, only partly  included. The visualized paranasal sinuses,  orbits, mastoid air cells  are otherwise unremarkable.           IMPRESSION:     No acute intracranial hemorrhage or hydrocephalus. If there is further  clinical concern, MRI could be considered for further evaluation.    MRI cervical spine without contrast 10/24/2023:  FINDINGS: There are some arthritic changes at the atlantooccipital  articulation, some joint space narrowing and marginal spurring.      At C1-2 there is what appears to be a chronic fracture off the superior  aspect of the odontoid process, it is better appreciated on yesterday's  cervical spine CT.  There is a fracture fragment measuring 13 x 9 x 7 mm  off the superior aspect of the odontoid. There is T2 hyperintense fluid  cleft between it and the remainder of the odontoid and this is felt to  be a chronic abnormality. There is also some arthritic change where the  posterior ring of C1 abuts the superior aspect of the spinous process of  C2 with fluid at this site. There is some thickening of posterior  ligaments that abuts the posterior cord, but only minimally narrows the  canal at the C1 ring level.      At C2-3 there is moderate bilateral facet overgrowth. There is mild  posterior spurring.  There is some thickening of the posterior ligaments  that abuts the posterior spinal cord with mild narrowing of the canal  and some uncovertebral joint spurring into the foramen, and there is  mild-to-moderate left and moderate right foraminal narrowing.      At C3-4 there is moderate-to-severe bilateral facet overgrowth and there  is a 5-6 mm degenerative anterolisthesis of C3 with respect to C4 and  there is severe narrowing of the thecal sac with flattening of the  anterior posterior diameter of the cord. It is difficult to exclude some  minimal T2 high signal in the substance of the cord from some minimal  myelomalacia at this level. There is loss of vertical height of the  foramina, facet spurring into the foramen and uncovertebral joint  spurring in  the foramina.  There is severe bilateral foraminal narrowing  that could compromise the exiting C4 nerve roots bilaterally.      At C4-5 there is disc space narrowing.  There is flowing ossification  anterior to the vertebrae and disc space at C4-5 serving to ankylose or  fuse the C4 and C5 vertebrae anteriorly. There is also mild-to-moderate  facet overgrowth and bony fusion across the facet joints. There is mild  posterior spurring that abuts the ventral cord and only minimal canal  narrowing.  There is uncovertebral joint spurring into the foramina and  there is mild-to-moderate right and moderate left bony foraminal  narrowing.      At C5-6 there is severe disc space narrowing.  There appears to be some  bony fusion across the endplates and some anterior marginal bridging  spurs. There is 2 mm retrolisthesis of C5 on C6 and mild posterior  endplate spurring that abuts the ventral surface of the cord mildly  narrowing the canal.  There is only minimal facet overgrowth but there  is uncovertebral joint spurring into the neural foramina bilaterally  resulting in moderate left and severe right bony foraminal narrowing.      At C6-7 there is severe disc space narrowing.  There are degenerative  endplate changes and small minimal posterior spurring.  There is only  minimal canal narrowing.  The facets are normal.  There is uncovertebral  joint spurring into the neural foramina and there is moderate bilateral  bony foraminal narrowing.      At C7-T1 there is mild-to-moderate bilateral facet overgrowth and there  is a 3 mm degenerative anterolisthesis of C7 on T1. There is no canal  narrowing.  There is mild left and mild-to-moderate right bony foraminal  narrowing.      IMPRESSION:  1. No acute abnormality is seen in the cervical spine.   2. This patient has an old nonunited displaced fracture off the superior  tip of the odontoid process compatible with an old type I odontoid  process fracture.  The odontoid fracture  fragment measures 13 x 9 mm in  anterior posterior, medial lateral dimension and 7 mm in cranial caudal  dimension and it is displaced 4 mm superiorly and is posterior to the  anterior ring of C1. Clearly this is a chronic abnormality. There is  mild canal narrowing at the C1 ring level.  3. This patient has advanced cervical spondylosis as described above.   The findings are most pronounced at C3-4 where there is  moderate-to-severe bilateral facet overgrowth and a 5-6 mm degenerative  anterolisthesis of C3 with respect to C4 and there is severe narrowing  of the thecal sac with flattening of the anterior posterior diameter of  the cord at the C3-4 level.  There is also severe bilateral foraminal  narrowing at the C3-4 cervical level.  4. There is mild canal narrowing and mild-to-moderate left and moderate  right bony foraminal narrowing at C2-3. There is severe disc space  narrowing, bony fusion across the anterior endplates at C4-5 and C5-6,  and there are posterior endplate spurs with only minimal canal narrowing  at C4-5 and mild canal narrowing at C5-6.  There is uncovertebral joint  spurring into the foraminal with mild-to-moderate right and moderate  left bony foraminal narrowing at C4-5 and there is moderate left and  severe right bony foraminal narrowing at C5-6. At C6-7, posterior spurs  only result in minimal canal narrowing and uncovertebral joint spurring  of the foramen and results in moderate bilateral bony foraminal  narrowing at C6-7.  The remainder of the cervical spine MRI is  unremarkable.        I personally reviewed the CT head and cervical MRI obtained today on 10/24/23.         Results Review:   I reviewed the patient's new clinical results.  I personally viewed and interpreted the patient's labs, medications and chart    Vital Signs   Temp:  [97.3 °F (36.3 °C)-97.5 °F (36.4 °C)] 97.3 °F (36.3 °C)  Heart Rate:  [64-90] 80  Resp:  [15-18] 16  BP: (131-174)/(68-94) 131/68    Physical  Exam:  Physical Exam  Vitals reviewed.   Constitutional:       General: He is not in acute distress.     Appearance: Normal appearance. He is not ill-appearing, toxic-appearing or diaphoretic.   HENT:      Head: Normocephalic.      Nose: Nose normal.      Mouth/Throat:      Mouth: Mucous membranes are moist.      Pharynx: Oropharynx is clear.   Eyes:      Extraocular Movements: Extraocular movements intact.      Conjunctiva/sclera: Conjunctivae normal.      Pupils: Pupils are equal, round, and reactive to light.   Cardiovascular:      Rate and Rhythm: Normal rate.   Pulmonary:      Effort: Pulmonary effort is normal.   Musculoskeletal:         General: Normal range of motion.      Cervical back: Normal range of motion. No rigidity, tenderness or bony tenderness. No pain with movement. Normal range of motion.   Skin:     General: Skin is warm.   Neurological:      Mental Status: He is alert and oriented to person, place, and time. Mental status is at baseline.      Coordination: Finger-Nose-Finger Test normal.   Psychiatric:         Mood and Affect: Mood normal.         Speech: Speech normal.         Behavior: Behavior normal.         Thought Content: Thought content normal.         Judgment: Judgment normal.       Neurologic Exam     Mental Status   Oriented to person, place, and time.   Attention: normal. Concentration: normal.   Speech: speech is normal   Level of consciousness: alert  Knowledge: consistent with education.   Baseline speech impediment.  Family at bedside states his current neuro status and speech is normal for him.     Cranial Nerves     CN II   Visual fields full to confrontation.   Visual acuity: normal    CN III, IV, VI   Pupils are equal, round, and reactive to light.  Right pupil: Size: 3 mm. Shape: regular. Reactivity: brisk.   Left pupil: Size: 3 mm. Shape: regular. Reactivity: brisk.   Nystagmus: none   Diplopia: none  Upgaze: normal  Downgaze: normal    CN V   Facial sensation intact.    Right facial sensation deficit: none  Left facial sensation deficit: none    CN VII   Facial expression full, symmetric.     CN VIII   Hearing: intact    CN XI   CN XI normal.     CN XII   CN XII normal.     Motor Exam   Overall muscle tone: normal  Right arm pronator drift: absent  Left arm pronator drift: absentBilateral hand  strength and bilateral plantarflexion and dorsiflexion is 5/5.       Sensory Exam   Baseline neuropathy in hands and feet     Gait, Coordination, and Reflexes     Gait  Gait: (Gait deferred)    Coordination   Finger to nose coordination: normal      Assessment & Plan       Fall    Odontoid fracture with nonunion      Patient with fall with traumatic head injury yesterday.  Initial CT head imaging shows no acute intracranial abnormality.  The patient does have a history of being on blood thinners last dose yesterday morning.  The patient also has findings of type I odontoid fracture with nonunion.  MRI affirms that this is a chronic fracture.  Neurosurgery recommends repeat CT head imaging 8 hours from previous CT head imaging.  He does not report any symptomatology that relates to his neck and does not have any new significant balance issues inside his normal use of walker with ambulation.  He does not need any cervical bracing at this time.  He may follow-up as needed if he develops any new neuro neurologic symptoms or if he develops any neck pain, arm pain, weakness, numbness, tingling or saddle anesthesia or bowel/bladder incontinence.  If repeat head imaging is stable, it would be okay to send him back to his assisted living facility.      PLAN:     If 8-hour follow-up head CT stable, patient may go back to [facility]  Please call neurosurgery back if any abnormalities repeat found on CT head  No cervical collar needed  Follow-up as needed    I discussed the patient's findings and my recommendations with patient and Dr. Francisco.    During patient visit, I utilized appropriate  "personal protective equipment including mask and gloves.  Mask used was standard procedure mask. Appropriate PPE was worn during the entire visit.  Hand hygiene was completed before and after.     Jesus Rai, APRN  10/24/23  14:30 EDT    \"Dictated utilizing Dragon dictation\".    "

## 2023-10-24 NOTE — ED PROVIDER NOTES
EMERGENCY DEPARTMENT ENCOUNTER    Room Number:  107/1  Date of encounter:  10/24/2023  PCP: Rigoberto Camejo MD  Patient Care Team:  Rigoberto Camejo MD as PCP - General (Internal Medicine)  Dolores Colvin APRN as Nurse Practitioner (Gastroenterology)  Sarabjit Schulz MD as Consulting Physician (Gastroenterology)   Independent Historians: Patient    HPI:  Chief Complaint: Fall  A complete HPI/ROS/PMH/PSH/SH/FH are unobtainable due to: N/A    Chronic or social conditions impacting patient care (social determinants of health): Nursing home resident    Context: Jonnathan Trejo is a 89 y.o. male with past medical history of HTN, HLD, neuropathy, CAD s/p stenting, anticoagulated on Plavix and Eliquis, and hypothyroidism who arrives to the ED with complaint of head injury.  Patient states that he was at home walking with his walker when he lost his balance, fell backwards and struck the back of his head.  Patient has a laceration to the back of his head, denies any loss of consciousness, no headache, patient denies any other injuries or any other complaints.    Review of prior external notes (non-ED): Last office visit with PCP on 10/19/2023 for neuropathy and hypothyroidism.    Review of prior external test results outside of this encounter: None    PAST MEDICAL HISTORY  Active Ambulatory Problems     Diagnosis Date Noted    No Active Ambulatory Problems     Resolved Ambulatory Problems     Diagnosis Date Noted    No Resolved Ambulatory Problems     Past Medical History:   Diagnosis Date    Atrial fibrillation     Bowel trouble     Congestive heart failure     Constipation     Disease of thyroid gland     GERD (gastroesophageal reflux disease)     History of heart valve replacement     Hyperlipidemia        The patient has started, but not completed, their COVID-19 vaccination series.    PAST SURGICAL HISTORY  Past Surgical History:   Procedure Laterality Date    CARDIAC VALVE REPLACEMENT      COLONOSCOPY       EXPLORATORY LAPAROTOMY      UPPER GASTROINTESTINAL ENDOSCOPY           FAMILY HISTORY  Family History   Problem Relation Age of Onset    Colon cancer Brother     Colon polyps Brother     Ulcerative colitis Brother     Irritable bowel syndrome Niece     Crohn's disease Neg Hx          SOCIAL HISTORY  Social History     Socioeconomic History    Marital status: Single   Tobacco Use    Smoking status: Never    Smokeless tobacco: Never   Vaping Use    Vaping Use: Never used   Substance and Sexual Activity    Alcohol use: Yes     Comment: occasional    Drug use: Never    Sexual activity: Defer         ALLERGIES  Sulfa antibiotics        REVIEW OF SYSTEMS  Review of Systems     All systems reviewed and negative except for those discussed in HPI.       PHYSICAL EXAM    I have reviewed the triage vital signs and nursing notes.    ED Triage Vitals [10/24/23 0108]   Temp Heart Rate Resp BP SpO2   97.5 °F (36.4 °C) 64 15 147/71 97 %      Temp src Heart Rate Source Patient Position BP Location FiO2 (%)   -- -- -- -- --       Physical Exam    GENERAL: alert and oriented x4, not distressed, in a c-collar  HENT: normocephalic, 3.5 cm posterior scalp flap-like laceration, no depressed skull fracture  EYES: no scleral icterus, PERRL, EOMI  CV: regular rhythm, regular rate, no murmurs, rubs, or gallops  RESPIRATORY: normal effort, CTAB  ABDOMEN: soft/nontender  MUSCULOSKELETAL: no deformity, pelvis stable, no hip tenderness, normal range of motion of all extremities and no extremity tenderness.  NEURO: alert, moves all extremities, no focal neuro deficits, follows commands  SKIN: warm, dry, no rash, see above  Psych: Appropriate mood and affect      Nursing notes and vital signs reviewed      LAB RESULTS  Recent Results (from the past 24 hour(s))   Comprehensive Metabolic Panel    Collection Time: 10/24/23  3:15 AM    Specimen: Blood   Result Value Ref Range    Glucose 105 (H) 65 - 99 mg/dL    BUN 14 8 - 23 mg/dL    Creatinine 0.68  (L) 0.76 - 1.27 mg/dL    Sodium 132 (L) 136 - 145 mmol/L    Potassium 4.4 3.5 - 5.2 mmol/L    Chloride 99 98 - 107 mmol/L    CO2 23.6 22.0 - 29.0 mmol/L    Calcium 9.4 8.2 - 9.6 mg/dL    Total Protein 6.9 6.0 - 8.5 g/dL    Albumin 4.0 3.5 - 5.2 g/dL    ALT (SGPT) 33 1 - 41 U/L    AST (SGOT) 32 1 - 40 U/L    Alkaline Phosphatase 92 39 - 117 U/L    Total Bilirubin 0.4 0.0 - 1.2 mg/dL    Globulin 2.9 gm/dL    A/G Ratio 1.4 g/dL    BUN/Creatinine Ratio 20.6 7.0 - 25.0    Anion Gap 9.4 5.0 - 15.0 mmol/L    eGFR 84.5 >60.0 mL/min/1.73   Protime-INR    Collection Time: 10/24/23  3:15 AM    Specimen: Blood   Result Value Ref Range    Protime 15.1 (H) 11.7 - 14.2 Seconds    INR 1.18 (H) 0.90 - 1.10   CBC Auto Differential    Collection Time: 10/24/23  3:15 AM    Specimen: Blood   Result Value Ref Range    WBC 8.23 3.40 - 10.80 10*3/mm3    RBC 3.88 (L) 4.14 - 5.80 10*6/mm3    Hemoglobin 12.0 (L) 13.0 - 17.7 g/dL    Hematocrit 36.2 (L) 37.5 - 51.0 %    MCV 93.3 79.0 - 97.0 fL    MCH 30.9 26.6 - 33.0 pg    MCHC 33.1 31.5 - 35.7 g/dL    RDW 14.2 12.3 - 15.4 %    RDW-SD 48.5 37.0 - 54.0 fl    MPV 9.8 6.0 - 12.0 fL    Platelets 232 140 - 450 10*3/mm3    Neutrophil % 71.1 42.7 - 76.0 %    Lymphocyte % 14.6 (L) 19.6 - 45.3 %    Monocyte % 12.4 (H) 5.0 - 12.0 %    Eosinophil % 1.3 0.3 - 6.2 %    Basophil % 0.4 0.0 - 1.5 %    Immature Grans % 0.2 0.0 - 0.5 %    Neutrophils, Absolute 5.85 1.70 - 7.00 10*3/mm3    Lymphocytes, Absolute 1.20 0.70 - 3.10 10*3/mm3    Monocytes, Absolute 1.02 (H) 0.10 - 0.90 10*3/mm3    Eosinophils, Absolute 0.11 0.00 - 0.40 10*3/mm3    Basophils, Absolute 0.03 0.00 - 0.20 10*3/mm3    Immature Grans, Absolute 0.02 0.00 - 0.05 10*3/mm3    nRBC 0.0 0.0 - 0.2 /100 WBC       Ordered the above labs and independently reviewed and interpreted the results by me.        RADIOLOGY  CT Head Without Contrast    Result Date: 10/24/2023  CT HEAD WO CONTRAST-  INDICATIONS: Head injury  TECHNIQUE: Radiation dose reduction  techniques were utilized, including automated exposure control and exposure modulation based on body size. Noncontrast head CT  COMPARISON: 10/24/2023 at 0157 hours  FINDINGS:   No acute intracranial hemorrhage, midline shift or mass effect. No acute territorial infarct is identified.  Moderate to prominent periventricular hypodensities suggest chronic small vessel ischemic change in a patient this age.  Arterial calcifications are seen at the base of the brain.  Ventricles, cisterns, cerebral sulci are unremarkable for patient age.  Likely mucous retention cyst in right maxillary sinus, only partly included. The visualized paranasal sinuses, orbits, mastoid air cells are otherwise unremarkable.         No acute intracranial hemorrhage or hydrocephalus. If there is further clinical concern, MRI could be considered for further evaluation.  This report was finalized on 10/24/2023 1:17 PM by Dr. Mason Cummings M.D on Workstation: [id number]      MRI Cervical Spine Without Contrast    Result Date: 10/24/2023  MRI OF THE CERVICAL SPINE WITHOUT CONTRAST 10/24/2023  CLINICAL HISTORY: Patient had a dizzy spell yesterday, fell yesterday, complains of headache and some dizziness.  TECHNIQUE: Sagittal T1, gradient echo T2 and fat-suppressed fast spin-echo T2 weighted images were obtained of the cervical spine. In addition, axial gradient echo and fast spin-echo T2 weighted images were obtained from the skull base to T1. Oblique sagittal T2 weighted images were obtained angle through the right and left cervical neural foramen.  This is correlated to a cervical spine CT earlier this morning on 10/24/2023 at 1:55 a.m.  FINDINGS: There are some arthritic changes at the atlantooccipital articulation, some joint space narrowing and marginal spurring.  At C1-2 there is what appears to be a chronic fracture off the superior aspect of the odontoid process, it is better appreciated on yesterday's cervical spine CT.  There is a fracture  fragment measuring 13 x 9 x 7 mm off the superior aspect of the odontoid. There is T2 hyperintense fluid cleft between it and the remainder of the odontoid and this is felt to be a chronic abnormality. There is also some arthritic change where the posterior ring of C1 abuts the superior aspect of the spinous process of C2 with fluid at this site. There is some thickening of posterior ligaments that abuts the posterior cord, but only minimally narrows the canal at the C1 ring level.  At C2-3 there is moderate bilateral facet overgrowth. There is mild posterior spurring.  There is some thickening of the posterior ligaments that abuts the posterior spinal cord with mild narrowing of the canal and some uncovertebral joint spurring into the foramen, and there is mild-to-moderate left and moderate right foraminal narrowing.  At C3-4 there is moderate-to-severe bilateral facet overgrowth and there is a 5-6 mm degenerative anterolisthesis of C3 with respect to C4 and there is severe narrowing of the thecal sac with flattening of the anterior posterior diameter of the cord. It is difficult to exclude some minimal T2 high signal in the substance of the cord from some minimal myelomalacia at this level. There is loss of vertical height of the foramina, facet spurring into the foramen and uncovertebral joint spurring in the foramina.  There is severe bilateral foraminal narrowing that could compromise the exiting C4 nerve roots bilaterally.  At C4-5 there is disc space narrowing.  There is flowing ossification anterior to the vertebrae and disc space at C4-5 serving to ankylose or fuse the C4 and C5 vertebrae anteriorly. There is also mild-to-moderate facet overgrowth and bony fusion across the facet joints. There is mild posterior spurring that abuts the ventral cord and only minimal canal narrowing.  There is uncovertebral joint spurring into the foramina and there is mild-to-moderate right and moderate left bony foraminal  narrowing.  At C5-6 there is severe disc space narrowing.  There appears to be some bony fusion across the endplates and some anterior marginal bridging spurs. There is 2 mm retrolisthesis of C5 on C6 and mild posterior endplate spurring that abuts the ventral surface of the cord mildly narrowing the canal.  There is only minimal facet overgrowth but there is uncovertebral joint spurring into the neural foramina bilaterally resulting in moderate left and severe right bony foraminal narrowing.  At C6-7 there is severe disc space narrowing.  There are degenerative endplate changes and small minimal posterior spurring.  There is only minimal canal narrowing.  The facets are normal.  There is uncovertebral joint spurring into the neural foramina and there is moderate bilateral bony foraminal narrowing.  At C7-T1 there is mild-to-moderate bilateral facet overgrowth and there is a 3 mm degenerative anterolisthesis of C7 on T1. There is no canal narrowing.  There is mild left and mild-to-moderate right bony foraminal narrowing.      1. No acute abnormality is seen in the cervical spine. 2. This patient has an old nonunited displaced fracture off the superior tip of the odontoid process compatible with an old type I odontoid process fracture.  The odontoid fracture fragment measures 13 x 9 mm in anterior posterior, medial lateral dimension and 7 mm in cranial caudal dimension and it is displaced 4 mm superiorly and is posterior to the anterior ring of C1. Clearly this is a chronic abnormality. There is mild canal narrowing at the C1 ring level. 3. This patient has advanced cervical spondylosis as described above. The findings are most pronounced at C3-4 where there is moderate-to-severe bilateral facet overgrowth and a 5-6 mm degenerative anterolisthesis of C3 with respect to C4 and there is severe narrowing of the thecal sac with flattening of the anterior posterior diameter of the cord at the C3-4 level.  There is also  severe bilateral foraminal narrowing at the C3-4 cervical level. 4. There is mild canal narrowing and mild-to-moderate left and moderate right bony foraminal narrowing at C2-3. There is severe disc space narrowing, bony fusion across the anterior endplates at C4-5 and C5-6, and there are posterior endplate spurs with only minimal canal narrowing at C4-5 and mild canal narrowing at C5-6.  There is uncovertebral joint spurring into the foraminal with mild-to-moderate right and moderate left bony foraminal narrowing at C4-5 and there is moderate left and severe right bony foraminal narrowing at C5-6. At C6-7, posterior spurs only result in minimal canal narrowing and uncovertebral joint spurring of the foramen and results in moderate bilateral bony foraminal narrowing at C6-7.  The remainder of the cervical spine MRI is unremarkable.  The results were communicated to ROBERTO Yusuf, in the [facility] Unit taking care of the patient by telephone on 10/24/2023 at 11:30 a.m.        XR Chest 1 View    Result Date: 10/24/2023  XR CHEST 1 VW-  HISTORY: Male who is 89 years-old, cough  TECHNIQUE: Frontal view of the chest  COMPARISON: None available  FINDINGS: Heart size is normal. Cardiac valve marker is noted. Aorta is calcified. Pulmonary vasculature is unremarkable. Minimal likely atelectasis at the left base. No focal pulmonary consolidation, pleural effusion, or pneumothorax. Gas-filled loops of bowel are only partly included, correlate clinically. No acute osseous process.      Minimal likely atelectasis at the left base. Follow-up as clinical indications persist.  This report was finalized on 10/24/2023 9:37 AM by Dr. Mason Cummings M.D on Workstation: [id number]      XR Spine Cervical 2 or 3 View    Result Date: 10/24/2023  XR SPINE CERVICAL 2 OR 3 VW-  INDICATIONS: Fragmentation of the dens on prior CT exam.  TECHNIQUE: 4 VIEWS OF THE cervical spine  COMPARISON: Correlated with CT  from 10/24/2023  FINDINGS:  Fragmentation of the dens, demonstrating the prior CT exam, is also suggested on this exam, but less well characterized. No other fractures are noted. With flexion, the anterior margin of C1 is about 1.3 cm anterior to the anterior margin of C2 vertebral body. However, with extension, the anterior margin of C1 nearly aligns with the anterior margin of C2 vertebral body. Mild anterolisthesis of C3 on C4, not significantly changed between flexion and extension. Alignment otherwise does not change significantly between flexion and extension. Multilevel endplate spurring, disc base narrowing, and facet arthropathy are conspicuous.       Redemonstration of fragmentation of the dens. Conspicuous variation in alignment of C1 with C2 between flexion and extension, as described above.    This report was finalized on 10/24/2023 7:14 AM by Dr. Mason Cummings M.D on Workstation: [id number]      CT Cervical Spine Without Contrast    Addendum Date: 10/24/2023    ADDENDUM: 10 24 23 02:50 Verify Receipt Verified receipt with  CONOR Ramos on 10 24 02:50 (-04:00) Electronically signed by Lindsay Ornelas DO American Board of     Result Date: 10/24/2023  CR Patient: MARBIN CRUZ  Time Out: 02:36 Exam(s): CT C SPINE EXAM:   CT Cervical Spine Without Intravenous Contrast CLINICAL HISTORY:    Reason for exam: fall with head injury on Eliquis and Plavix. TECHNIQUE:   Axial computed tomography images of the cervical spine without intravenous contrast.  CTDI is 15.92 mGy and DLP is 338.9 mGy-cm.  This CT exam was performed according to the principle of ALARA (As Low As Reasonably Achievable) by using one or more of the following dose reduction techniques: automated exposure control, adjustment of the mA and or kV according to patient size, and or use of iterative reconstruction technique. COMPARISON:   No relevant prior studies available. FINDINGS:   Advanced multilevel degenerative disc disease and  facet arthropathy resulting in multilevel central canal and foraminal narrowing.  5 mm anterolisthesis C3 and C4 age indeterminate.  Likely result of severe facet arthropathy given the absence of soft tissue swelling in the region. Anterior migration or displacement of the dens relative to the anterior C1 arch.  Though findings are likely chronic or subacute, consideration of MRI is recommended, particularly if focal point tenderness is present.    Communications:  Verify Receipt Electronically signed by Brittany Ornelas DOomatjovita [employer] on 10-24-23 at 0236    CT Head Without Contrast    Result Date: 10/24/2023  Patient: MARBIN CRUZ  Time Out: 02:23 Exam(s): CT HEAD Without Contrast EXAM:   CT Head Without Intravenous Contrast CLINICAL HISTORY:    Reason for exam: fall with head injury on Eliquis and Plavix. TECHNIQUE:   Axial computed tomography images of the head brain without intravenous contrast.  CTDI is 56.23 mGy and DLP is 1097.6 mGy-cm.  This CT exam was performed according to the principle of ALARA (As Low As Reasonably Achievable) by using one or more of the following dose reduction techniques: automated exposure control, adjustment of the mA and or kV according to patient size, and or use of iterative reconstruction technique. COMPARISON:   No relevant prior studies available. FINDINGS:   Brain:  Age appropriate generalized cerebral atrophy.  Patchy and confluent low attenuation throughout the periventricular white matter compatible with sequela of chronic small vessel disease.  Negative for intracranial hemorrhage or mass-effect.   Ventricles:  Unremarkable.  No ventriculomegaly.   Bones joints:  Unremarkable.  No acute fracture.   Soft tissues:  Unremarkable.   Sinuses:  Unremarkable as visualized.  No acute sinusitis.   Mastoid air cells:  Unremarkable as visualized.  No mastoid effusion. IMPRESSION:       No acute intracranial abnormality     Electronically signed by  Arik Bundy DO, Diplomate American Board of Radiology on 10-24-23 at 0223     I ordered the above noted radiological studies.  These were independently interpreted and reviewed by me.  See dictation for official radiology interpretation.      PROCEDURES    Laceration Repair    Date/Time: 10/24/2023 2:48 AM    Performed by: Javad Julio PA  Authorized by: Javad Julio PA    Consent:     Consent obtained:  Verbal    Consent given by:  Patient    Risks discussed:  Pain, need for additional repair, poor cosmetic result, vascular damage and poor wound healing    Alternatives discussed:  No treatment  Universal protocol:     Procedure explained and questions answered to patient or proxy's satisfaction: yes      Immediately prior to procedure, a time out was called: yes      Patient identity confirmed:  Verbally with patient and arm band  Anesthesia:     Anesthesia method:  Local infiltration    Local anesthetic:  Lidocaine 1% WITH epi  Laceration details:     Location:  Scalp    Scalp location:  Occipital    Length (cm):  3.5  Pre-procedure details:     Preparation:  Patient was prepped and draped in usual sterile fashion  Exploration:     Hemostasis achieved with:  Epinephrine and direct pressure    Wound extent: no fascia violation noted, no foreign bodies/material noted, no muscle damage noted, no nerve damage noted, no tendon damage noted and no underlying fracture noted      Contaminated: no    Treatment:     Area cleansed with:  Povidone-iodine    Amount of cleaning:  Standard    Irrigation solution:  Sterile saline    Irrigation method:  Syringe    Debridement:  Minimal  Skin repair:     Repair method:  Staples    Number of staples:  5  Approximation:     Approximation:  Close  Repair type:     Repair type:  Intermediate  Post-procedure details:     Dressing:  Antibiotic ointment and non-adherent dressing    Procedure completion:  Tolerated well, no immediate complications        MEDICATIONS GIVEN IN  ER    Medications   Tetanus-Diphth-Acell Pertussis (BOOSTRIX) injection 0.5 mL (0.5 mL Intramuscular Given 10/24/23 0935)         PROGRESS, DATA ANALYSIS, CONSULTS, AND MEDICAL DECISION MAKING    All labs have been independently reviewed by me.  All radiology studies have been reviewed by me and discussed with radiologist dictating the report.   EKG's independently viewed and interpreted by me.  Discussion below represents my analysis of pertinent findings related to patient's condition, differential diagnosis, treatment plan and final disposition.    DDx:  Includes, but is not limited to scalp laceration, skull fracture, intracranial hemorrhage, C-spine injury    After my initial assessment I am concerned about intracranial hemorrhage and patient may need hospitalization due to monitoring and neurosurgical consultation.    ED Course as of 10/24/23 2041   Tue Oct 24, 2023   0215 Patient [facility], stating that all of [facility] specialists are at [facility].   [CHAPARRITA]   0347 WBC: 8.23 [CHAPARRITA]   0347 Hemoglobin(!): 12.0 [CHAPARRITA]   0347 Hematocrit(!): 36.2 [CHAPARRITA]   0347 Platelets: 232 [CHAPARRITA]   0347 Glucose(!): 105 [CHAPARRITA]   0347 BUN: 14 [CHAPARRITA]   0347 Creatinine(!): 0.68 [CHAPARRITA]   0347 Sodium(!): 132 [CHAPARRITA]   0347 Potassium: 4.4 [CHAPARRITA]   0347 Chloride: 99 [CHAPARRITA]   0347 CO2: 23.6 [CHAPARRITA]   0347 INR(!): 1.18 [CHAPARRITA]   0352 Patient history, ER presentation and evaluation discussed with Dr. Francisco, recommended flexion-extension x-rays to check for stability since patient does not have neck pain and findings are likely chronic. [CHAPARRITA]   0430 Plan for evaluation with additional x-rays discussed with patient.  Patient is becoming increasingly frustrated and requesting discharge stating that if they do find something wrong he is not going to get surgery anyways.  [CHAPARRITA]   0618 Patient care discussed with and turned over to Radha Wall PA-C, pending radiology results. [CHAPARRITA]   0645 Patient  rechecked, patient has painless, normal range of motion of the C-spine, patient continues to deny any pain.  Have discussed with the patient's plan for imaging and hopefully discharge home since he does not want any surgery and he has no neck pain.  Patient has been very upset with the care and delays in care related to radiology reports. [CHAPARRITA]   0845 I discussed patient with Dr. Francisco.  Based on patient's x-rays he recommends noncontrasted MRI of the cervical spine for definitive evaluation.  I discussed this with the patient and his niece at the bedside, they are agreeable with this imaging. [KA]   0845 I discussed the patient with ROBERTO Flowers for the ED observation unit including history presentation and imaging and she agrees to admit on behalf of Dr. Hooper. [KA]      ED Course User Index  [CHAPARRITA] Javad Julio PA  [KA] Dodie Wall PA       PPE:  The patient wore a mask and I wore a mask and all appropriate PPE throughout the entire patient encounter.      AS OF 20:41 EDT VITALS:    BP - 143/66  HR - 74  TEMP - 97.5 °F (36.4 °C) (Oral)  O2 SATS - 100%      DIAGNOSIS  Final diagnoses:   Laceration of scalp, initial encounter   Minor head injury, initial encounter   Abnormal computed tomography of cervical spine         DISPOSITION  ADMISSION    Discussed treatment plan and reason for admission with pt/family and admitting physician.  Pt/family voiced understanding of the plan for admission for further testing/treatment as needed.       Note Disclaimer: At [facility], we believe that sharing information builds trust and better relationships. You are receiving this note because you recently visited [facility]. It is possible you will see health information before a provider has talked with you about it. This kind of information can be easy to misunderstand. To help you fully understand what it means for your health, we urge you to discuss this note with your provider.       Javad Julio,  PA  10/24/23 204

## 2023-10-24 NOTE — ED NOTES
Spoke to pt's POA/great niece, Kyara, for an update on pt's status. Kyara states she will be here in about half an hour.

## 2023-10-24 NOTE — PROGRESS NOTES
TAI LAZO Attestation Note    I supervised care provided by the midlevel provider.    The PATT and I have discussed this patient's history, physical exam, and treatment plan. I have reviewed the documentation and personally had a face to face interaction with the patient  I affirm the documentation and agree with the treatment and plan. I provided a substantive portion of the care of this patient.  I personally performed the physical exam, in its entirety.  My personal findings are documented in below:    History:  89-year-old male who is fully anticoagulated on Eliquis and on antiplatelet therapy with Plavix presents with head injury status post fall.  Neurosurgery has been involved in the patient's care as there were some abnormalities on cervical spine imaging.  Patient has returned from MRI of cervical spine at this time.  He had complains of no headache at this time.  He is not quite sure why he fell.  He does live in an assisted living facility with care provided for showering and medications.    Physical Exam:  General: No acute distress.  HENT: NCAT, PERRL, Nares patent.  Posterior calvarial laceration is clean dry and intact with 5 staples in place.  Eyes: no scleral icterus.  Neck: trachea midline, no ROM limitations.  CV: Pink warm and well-perfused throughout  Respiratory: No distress or increased work of breathing  Musculoskeletal: no deformity.  Neuro: alert, moves all extremities, follows commands.  Skin: warm, dry.    Assessment and Plan:  At this time we await neurosurgery's final recommendations on MRI imaging of the cervical spine.  As we will be waiting on final results of the MRI, I do recommend we move forward with CT head maladjustment 8 hours given that he is chronically anticoagulated and had clear calvarial trauma to make sure that he is low risk for delayed bleed.

## 2023-10-24 NOTE — ED PROVIDER NOTES
EMERGENCY DEPARTMENT ENCOUNTER    Room number:  03/03  Date Seen:  10/24/2023  Time of transfer:0600  PCP:  Rigoberto Camejo MD    Laboratory Results:  Recent Results (from the past 24 hour(s))   Comprehensive Metabolic Panel    Collection Time: 10/24/23  3:15 AM    Specimen: Blood   Result Value Ref Range    Glucose 105 (H) 65 - 99 mg/dL    BUN 14 8 - 23 mg/dL    Creatinine 0.68 (L) 0.76 - 1.27 mg/dL    Sodium 132 (L) 136 - 145 mmol/L    Potassium 4.4 3.5 - 5.2 mmol/L    Chloride 99 98 - 107 mmol/L    CO2 23.6 22.0 - 29.0 mmol/L    Calcium 9.4 8.2 - 9.6 mg/dL    Total Protein 6.9 6.0 - 8.5 g/dL    Albumin 4.0 3.5 - 5.2 g/dL    ALT (SGPT) 33 1 - 41 U/L    AST (SGOT) 32 1 - 40 U/L    Alkaline Phosphatase 92 39 - 117 U/L    Total Bilirubin 0.4 0.0 - 1.2 mg/dL    Globulin 2.9 gm/dL    A/G Ratio 1.4 g/dL    BUN/Creatinine Ratio 20.6 7.0 - 25.0    Anion Gap 9.4 5.0 - 15.0 mmol/L    eGFR 84.5 >60.0 mL/min/1.73   Protime-INR    Collection Time: 10/24/23  3:15 AM    Specimen: Blood   Result Value Ref Range    Protime 15.1 (H) 11.7 - 14.2 Seconds    INR 1.18 (H) 0.90 - 1.10   CBC Auto Differential    Collection Time: 10/24/23  3:15 AM    Specimen: Blood   Result Value Ref Range    WBC 8.23 3.40 - 10.80 10*3/mm3    RBC 3.88 (L) 4.14 - 5.80 10*6/mm3    Hemoglobin 12.0 (L) 13.0 - 17.7 g/dL    Hematocrit 36.2 (L) 37.5 - 51.0 %    MCV 93.3 79.0 - 97.0 fL    MCH 30.9 26.6 - 33.0 pg    MCHC 33.1 31.5 - 35.7 g/dL    RDW 14.2 12.3 - 15.4 %    RDW-SD 48.5 37.0 - 54.0 fl    MPV 9.8 6.0 - 12.0 fL    Platelets 232 140 - 450 10*3/mm3    Neutrophil % 71.1 42.7 - 76.0 %    Lymphocyte % 14.6 (L) 19.6 - 45.3 %    Monocyte % 12.4 (H) 5.0 - 12.0 %    Eosinophil % 1.3 0.3 - 6.2 %    Basophil % 0.4 0.0 - 1.5 %    Immature Grans % 0.2 0.0 - 0.5 %    Neutrophils, Absolute 5.85 1.70 - 7.00 10*3/mm3    Lymphocytes, Absolute 1.20 0.70 - 3.10 10*3/mm3    Monocytes, Absolute 1.02 (H) 0.10 - 0.90 10*3/mm3    Eosinophils, Absolute 0.11 0.00 - 0.40  10*3/mm3    Basophils, Absolute 0.03 0.00 - 0.20 10*3/mm3    Immature Grans, Absolute 0.02 0.00 - 0.05 10*3/mm3    nRBC 0.0 0.0 - 0.2 /100 WBC     I reviewed the above results.    Radiology:  XR Spine Cervical 2 or 3 View   Final Result       Redemonstration of fragmentation of the dens. Conspicuous variation in   alignment of C1 with C2 between flexion and extension, as described   above.               This report was finalized on 10/24/2023 7:14 AM by Dr. Mason Cummings M.D on Workstation: [id number]          CT Head Without Contrast   Final Result         Electronically signed by Arik Bundy DO, [employer]    [employer] on 10-24-23 at 0223      CT Cervical Spine Without Contrast   Final Result   Addendum (preliminary) 1 of 1   ADDENDUM:   10 24 23 02:50 Verify Receipt Verified receipt with  CONOR Ramos on 10 24    02:50 (-04:00)      Electronically signed by Brittany Ornelas DOomatjovita American Board of          Final            Communications:       Verify Receipt       Electronically signed by Arik Bundy DO, [employer]    [employer] on 10-24-23 at 0236      MRI Cervical Spine Without Contrast    (Results Pending)   XR Chest 1 View    (Results Pending)     I reviewed the above results    Medications ordered in ED:  Medications   sodium chloride 0.9 % flush 10 mL (has no administration in time range)   sodium chloride 0.9 % flush 10 mL (has no administration in time range)   sodium chloride 0.9 % flush 10 mL (has no administration in time range)   sodium chloride 0.9 % infusion 40 mL (has no administration in time range)   acetaminophen (TYLENOL) tablet 650 mg (has no administration in time range)   sennosides-docusate (PERICOLACE) 8.6-50 MG per tablet 2 tablet (has no administration in time range)     And   polyethylene glycol (MIRALAX) packet 17 g (has no administration in time range)     And   bisacodyl (DULCOLAX) EC tablet 5 mg (has no administration in time  range)     And   bisacodyl (DULCOLAX) suppository 10 mg (has no administration in time range)   Tetanus-Diphth-Acell Pertussis (BOOSTRIX) injection 0.5 mL (has no administration in time range)       Progress and Consult Notes:  ED Course as of 10/24/23 0916   Tue Oct 24, 2023   0215 Patient upset that he is here and not at [facility] which is where he usually receives his care, stating that all of his specialists are at [facility].   [CHAPARRITA]   0347 WBC: 8.23 [CHAPARRITA]   0347 Hemoglobin(!): 12.0 [CHAPARRITA]   0347 Hematocrit(!): 36.2 [CHAPARRITA]   0347 Platelets: 232 [CHAPARRITA]   0347 Glucose(!): 105 [CHAPARRITA]   0347 BUN: 14 [CHAPARRITA]   0347 Creatinine(!): 0.68 [CHAPARRITA]   0347 Sodium(!): 132 [CHAPARRITA]   0347 Potassium: 4.4 [CHAPARRITA]   0347 Chloride: 99 [CHAPARRITA]   0347 CO2: 23.6 [CHAPARRITA]   0347 INR(!): 1.18 [CHAPARRITA]   0352 Patient history, ER presentation and evaluation discussed with Dr. Francisco, recommended flexion-extension x-rays to check for stability since patient does not have neck pain and findings are likely chronic. [CHAPARRITA]   0430 Plan for evaluation with additional x-rays discussed with patient.  Patient is becoming increasingly frustrated and requesting discharge stating that if they do find something wrong he is not going to get surgery anyways.  [CHAPARRITA]   0618 Patient care discussed with and turned over to Radha Wall PA-C, pending radiology results. [CHAPARRITA]   0633 Patient rechecked, patient has painless, normal range of motion of the C-spine, patient continues to deny any pain.  Have discussed with the patient's plan for imaging and hopefully discharge home since he does not want any surgery and he has no neck pain.  Patient has been very upset with the care and delays in care related to radiology reports. [CHAPARRITA]   0845 I discussed patient with Dr. Francisco.  Based on patient's x-rays he recommends noncontrasted MRI of the cervical spine for definitive evaluation.  I discussed this with the patient and his niece at the bedside, they are agreeable with this imaging. [KA]   0869  I discussed the patient with ROBERTO Flowers for the ED observation unit including history presentation and imaging and she agrees to admit on behalf of Dr. Hooper. [KA]      ED Course User Index  [CHAPARRITA] Javad Julio PA  [KA] Dodie Wall PA         Diagnosis:  Final diagnoses:   Laceration of scalp, initial encounter   Minor head injury, initial encounter   Abnormal computed tomography of cervical spine         Provider attestation:  I personally reviewed the past medical history, past surgical history, social history, family history, current medications, and allergies as they appear in the chart.             Dodie Wall PA  10/24/23 0916

## 2023-10-24 NOTE — ED PROVIDER NOTES
MD ATTESTATION NOTE  The PATT and I have discussed this patient's history, physical exam, and treatment plan. I have reviewed the PATT documentation and I affirm the documentation and agree with their diagnostics, findings, treatment, plan, and disposition.    I provided a substantive portion of the care of this patient and personally had a face to face interaction with the patient. I personally performed the physical exam, in its entirety.  The attached note describes my personal findings.    PCP: Rigoberto Camejo MD  Patient Care Team:  Rigoberto Camejo MD as PCP - General (Internal Medicine)  Dolores Colvin APRN as Nurse Practitioner (Gastroenterology)  Sarabjit Schulz MD as Consulting Physician (Gastroenterology)     Jonnathan Trejo is a 89 y.o. male who presents to the ED c/o fall with closed head injury.  Patient reports that he had a mechanical fall, lost his balance, struck his head.  Patient complains of dull achy pain where he struck his head, no loss consciousness, no neck pain, no radicular pain, no weakness or numbness.  Patient is anticoagulated.    On exam:  General: NAD.  Head: Laceration on occiput, no crepitus or deformity, no raccoon eyes or merrill sign.  ENT: nares patent, no scleral icterus  Neck: Trachea midline, cervical collar in place.  Cardiac: regular rate and rhythm.  Lungs: normal effort.  Abdomen: Soft, NTTP.   Extremities: Moves all extremities well, no peripheral edema  Neuro: alert, MAEW, follows commands  Psych: calm, cooperative  Skin: Warm, dry.    Medical Decision Making:  After the initial H&P, I discussed pertinent information from history and physical exam with patient/family.  Discussed differential diagnosis.  Discussed plan for ED evaluation/work-up/treatment.  All questions answered.  Patient/family is agreeable with plan.    ED Course as of 10/26/23 2250   Tue Oct 24, 2023   0217 Patient upset that he is here and not at [facility] which is where he usually  receives his care, stating that all of his specialists are at [facility].   [CHAPARRITA]   0347 WBC: 8.23 [CHAPARRITA]   0347 Hemoglobin(!): 12.0 [CHAPARRITA]   0347 Hematocrit(!): 36.2 [CHAPARRITA]   0347 Platelets: 232 [CHAPARRITA]   0347 Glucose(!): 105 [CHAPARRITA]   0347 BUN: 14 [CHAPARRITA]   0347 Creatinine(!): 0.68 [CHAPARRITA]   0347 Sodium(!): 132 [CHAPARRITA]   0347 Potassium: 4.4 [CHAPARRITA]   0347 Chloride: 99 [CHAPARRITA]   0347 CO2: 23.6 [CHAPARRITA]   0347 INR(!): 1.18 [CHAPARRITA]   0352 Patient history, ER presentation and evaluation discussed with Dr. Francisco, recommended flexion-extension x-rays to check for stability since patient does not have neck pain and findings are likely chronic. [CHAPARRITA]   0430 Plan for evaluation with additional x-rays discussed with patient.  Patient is becoming increasingly frustrated and requesting discharge stating that if they do find something wrong he is not going to get surgery anyways.  [CHAPARRITA]   0618 Patient care discussed with and turned over to Radha Wall PA-C, pending radiology results. [CHAPARRIAT]   0633 Patient rechecked, patient has painless, normal range of motion of the C-spine, patient continues to deny any pain.  Have discussed with the patient's plan for imaging and hopefully discharge home since he does not want any surgery and he has no neck pain.  Patient has been very upset with the care and delays in care related to radiology reports. [CHAPARRITA]   0863 I discussed patient with Dr. Francisco.  Based on patient's x-rays he recommends noncontrasted MRI of the cervical spine for definitive evaluation.  I discussed this with the patient and his niece at the bedside, they are agreeable with this imaging. [KA]   0871 I discussed the patient with ROBERTO Flowers for the ED observation unit including history presentation and imaging and she agrees to admit on behalf of Dr. Hooper. [KA]      ED Course User Index  [CHAPARRITA] Javad Julio PA  [KA] Dodie Wall PA       Diagnosis  Final diagnoses:   Laceration of scalp, initial encounter   Minor head injury, initial  encounter   Abnormal computed tomography of cervical spine          Crow Garcia MD  10/26/23 8243

## 2023-10-24 NOTE — H&P
[facility]   HISTORY AND PHYSICAL    Patient Name: Jonnathan Trejo  : 1926  MRN: [id number]  Primary Care Physician:  Rigoberto Camejo MD  Date of admission: 10/24/2023    Subjective   Subjective     Chief Complaint:   Chief Complaint   Patient presents with    Fall    Head Injury         HPI:    Jonnathan Trejo is a pleasant afebrile 89 y.o.  male with a past medical history of hypertension, hyperlipidemia and is anticoagulated with Eliquis.    He presents to the emergency department at [facility] today with complaint of scalp laceration.  His imaging in the emergency department showed abnormalities of his cervical spine.  He has been admitted to the ED observation unit for further testing and evaluation.    Patient states that he stood up this morning while using his walker and hit his head on the dresser.  He denies losing consciousness.  Given that he is on anticoagulation a CT of his head and cervical spine were obtained in the emergency department as a precaution.  Unfortunately the CT of his cervical spine did show some concern regarding cervical fracture.    Patient underwent MRI at the request of the neurosurgery team.  Patient unable to tolerate hard collar so soft collar was obtained from distribution.  His MRI of his cervical spine without contrast shows no acute abnormality.  There is an old nunited displaced fracture of the superior tip of the odontoid process.     He did go for a repeat head CT this afternoon since it had been 8 hours since his initial CT and he is on both eliquis and plavix. This was found to be reassuring. He has been cleared for discharge home with pcp follow up.       Review of Systems   All systems were reviewed and negative except for: What is mentioned above    Personal History     Past Medical History:   Diagnosis Date    Bowel trouble        Past Surgical History:   Procedure Laterality Date    COLONOSCOPY      UPPER GASTROINTESTINAL  ENDOSCOPY         Family History: family history includes Colon cancer in his brother; Colon polyps in his brother; Irritable bowel syndrome in his niece; Ulcerative colitis in his brother. Otherwise pertinent FHx was reviewed and not pertinent to current issue.    Social History:  reports that he has never smoked. He has never used smokeless tobacco. He reports current alcohol use. He reports that he does not use drugs.    Home Medications:  Prucalopride Succinate, apixaban, ascorbic acid, bisacodyl, clopidogrel, docusate sodium, famotidine, fish oil, furosemide, guaiFENesin, lactulose, levothyroxine, loratadine, multivitamin with minerals, polyethylene glycol, simvastatin, spironolactone, and vitamin E    Allergies:  Allergies   Allergen Reactions    Sulfa Antibiotics Hives       Objective   Objective     Vitals:   Temp:  [97.5 °F (36.4 °C)] 97.5 °F (36.4 °C)  Heart Rate:  [64-90] 72  Resp:  [15-18] 16  BP: (143-174)/(71-94) 143/74  Physical Exam    Constitutional: Awake, alert elderly, appears very well for stated age   Eyes: PERRLA, sclerae anicteric, no conjunctival injection   HENT: Sutured laceration to patient's occiput, otherwise normocephalic and atraumatic exam, mucous membranes moist   Neck: Supple, no thyromegaly, no lymphadenopathy, trachea midline   Respiratory: Clear to auscultation bilaterally, nonlabored respirations    Cardiovascular: RRR, no murmurs, rubs, or gallops, palpable pedal pulses bilaterally   Gastrointestinal: Positive bowel sounds, soft, nontender, nondistended   Musculoskeletal: No bilateral ankle edema, no clubbing or cyanosis to extremities   Psychiatric: Appropriate affect, cooperative   Neurologic: Oriented x 3, strength symmetric in all extremities, Cranial Nerves grossly intact to confrontation, speech clear   Skin: No rashes     Result Review    Result Review:  I have personally reviewed the results from the time of this admission to 10/24/2023 08:43 EDT and agree with these  findings:  [x]  Laboratory list / accordion  []  Microbiology  [x]  Radiology  []  EKG/Telemetry   []  Cardiology/Vascular   []  Pathology  []  Old records  []  Other:  Most notable findings include: CT head shows chronic findings but CT C-spine shows concern regarding C3-4 injury      Assessment & Plan   Assessment / Plan     Brief Patient Summary:  Jonnathan Trejo is a 89 y.o. male who is being evaluated for normal imaging following mechanical fall this morning.    Active Hospital Problems:  Active Hospital Problems    Diagnosis     **Fall      Plan:     Mechanical fall   Consult to neurosurgery  Initial and repeat head CT reassuring without evidence of acute intracranial hemorrhage  CT showed concern regarding cervical fracture, MRI shows this to be an remote injury    DVT prophylaxis:  Mechanical DVT prophylaxis orders are present.    CODE STATUS:    Medical Intervention Limits: NO intubation (DNI)  Code Status (Patient has no pulse and is not breathing): No CPR (Do Not Attempt to Resuscitate)  Medical Interventions (Patient has pulse or is breathing): Limited Support    Admission Status:  I believe this patient meets observation status.    Electronically signed by ROBERTO Flowers, 10/24/23, 8:43 AM EDT.    This also serves as a discharge summary.    75 minutes has been spent by [facility] providers in the care of this patient while under observation status      I have worn appropriate PPE during this patient encounter, sanitized my hands both with entering and exiting patient's room.    I have discussed plan of care with patient including advance care plan and/or surrogate decision maker.  Patient advises that their great jonny Merino will be their primary surrogate decision maker

## 2023-10-24 NOTE — ED TRIAGE NOTES
To ER via EMS from [facility].  Pt was walking and fell striking head.  Pt states he lost his balance.  Small lac to back of head.  Pt is on Eliquis and Plavix according to records.  No LOC.

## 2023-10-24 NOTE — ED NOTES
Nursing report ED to floor  Jonnathan Trejo  89 y.o.  male    HPI :   Chief Complaint   Patient presents with    Fall    Head Injury       Admitting doctor:   Lucio Hooper MD    Admitting diagnosis:   The primary encounter diagnosis was Laceration of scalp, initial encounter. A diagnosis of Minor head injury, initial encounter was also pertinent to this visit.    Code status:   Current Code Status       Date Active Code Status Order ID Comments User Context       10/24/2023 0847 No CPR (Do Not Attempt to Resuscitate) [id number]  Shanna Guzman APRN ED        Question Answer    Code Status (Patient has no pulse and is not breathing) No CPR (Do Not Attempt to Resuscitate)    Medical Interventions (Patient has pulse or is breathing) Limited Support    Medical Intervention Limits: NO intubation (DNI)                    Allergies:   Sulfa antibiotics    Isolation:   No active isolations    Intake and Output    Intake/Output Summary (Last 24 hours) at 10/24/2023 0848  Last data filed at 10/24/2023 0508  Gross per 24 hour   Intake --   Output 1050 ml   Net -1050 ml       Weight:       10/24/23  0112   Weight: 60.8 kg (134 lb)       Most recent vitals:   Vitals:    10/24/23 0544 10/24/23 0631 10/24/23 0653 10/24/23 0801   BP:  168/94  143/74   BP Location:       Patient Position:    Lying   Pulse: 80 72 86 72   Resp:    16   Temp:       SpO2: 95% 97% 97% 96%   Weight:       Height:           Active LDAs/IV Access:   Lines, Drains & Airways       Active LDAs       Name Placement date Placement time Site Days    Peripheral IV 10/24/23 0315 Left Forearm 10/24/23  0315  Forearm  less than 1                    Labs (abnormal labs have a star):   Labs Reviewed   COMPREHENSIVE METABOLIC PANEL - Abnormal; Notable for the following components:       Result Value    Glucose 105 (*)     Creatinine 0.68 (*)     Sodium 132 (*)     All other components within normal limits    Narrative:     GFR Normal >60  Chronic Kidney Disease  <60  Kidney Failure <15    The GFR formula is only valid for adults with stable renal function between ages 18 and 70.   PROTIME-INR - Abnormal; Notable for the following components:    Protime 15.1 (*)     INR 1.18 (*)     All other components within normal limits   CBC WITH AUTO DIFFERENTIAL - Abnormal; Notable for the following components:    RBC 3.88 (*)     Hemoglobin 12.0 (*)     Hematocrit 36.2 (*)     Lymphocyte % 14.6 (*)     Monocyte % 12.4 (*)     Monocytes, Absolute 1.02 (*)     All other components within normal limits   CBC AND DIFFERENTIAL    Narrative:     The following orders were created for panel order CBC & Differential.  Procedure                               Abnormality         Status                     ---------                               -----------         ------                     CBC Auto Differential[[id number]]        Abnormal            Final result                 Please view results for these tests on the individual orders.       EKG:   No orders to display       Meds given in ED:   Medications   sodium chloride 0.9 % flush 10 mL (has no administration in time range)   sodium chloride 0.9 % flush 10 mL (has no administration in time range)   sodium chloride 0.9 % flush 10 mL (has no administration in time range)   sodium chloride 0.9 % infusion 40 mL (has no administration in time range)   acetaminophen (TYLENOL) tablet 650 mg (has no administration in time range)   sennosides-docusate (PERICOLACE) 8.6-50 MG per tablet 2 tablet (has no administration in time range)     And   polyethylene glycol (MIRALAX) packet 17 g (has no administration in time range)     And   bisacodyl (DULCOLAX) EC tablet 5 mg (has no administration in time range)     And   bisacodyl (DULCOLAX) suppository 10 mg (has no administration in time range)   Tetanus-Diphth-Acell Pertussis (BOOSTRIX) injection 0.5 mL (has no administration in time range)       Imaging results:  XR Spine Cervical 2 or 3  View    Result Date: 10/24/2023   Redemonstration of fragmentation of the dens. Conspicuous variation in alignment of C1 with C2 between flexion and extension, as described above.    This report was finalized on 10/24/2023 7:14 AM by Dr. Mason Cummings M.D on Workstation: [id number]      CT Cervical Spine Without Contrast    Addendum Date: 10/24/2023    ADDENDUM: 10 24 23 02:50 Verify Receipt Verified receipt with  CONOR Ramos on 10 24 02:50 (-04:00) Electronically signed by Brittany Ornelas DOomate [employer] Board of     Result Date: 10/24/2023  Communications:  Verify Receipt Electronically signed by Brittany Ornelas DOomatjovita [employer] on 10-24-23 at 0236    CT Head Without Contrast    Result Date: 10/24/2023  Electronically signed by Brittany Ornelas DOomate [employer] Board of [employer] on 10-24-23 at 0223     Ambulatory status:   - up with assist x1    Social issues:   Social History     Socioeconomic History    Marital status: Single   Tobacco Use    Smoking status: Never    Smokeless tobacco: Never   Vaping Use    Vaping Use: Never used   Substance and Sexual Activity    Alcohol use: Yes     Comment: occasionally    Drug use: Never    Sexual activity: Defer       NIH Stroke Scale:       Mayra Vick RN  10/24/23 08:48 EDT

## 2023-10-24 NOTE — DISCHARGE INSTRUCTIONS
Follow head injury precautions, Tylenol as needed for pain.  Keep laceration clean and dry, apply antibiotic ointment once or twice daily, watch carefully for signs of infection, staples need to be removed in 7 days. Return to care if any complications.

## 2023-10-24 NOTE — CASE MANAGEMENT/SOCIAL WORK
Continued Stay Note  [facility]     Patient Name: Jonnathan Trejo  MRN: [id number]  Today's Date: 10/24/2023    Admit Date: 10/24/2023        Discharge Plan       Row Name 10/24/23 1753       Plan    Plan Comments I spoke with the patient and his niece and POA Kyara Diaz at bedside with his permission. I introduced myself and explained my role as . I verified the information on the facesheet and his PCP as Dr. Camejo. The patient uses [facility] and can pay for his medications, delivered by [facility]. He lives in a personal care facility. There are zero steps to enter and he is able to enter and maneuver around with no issues. He is assisted with his ADL’s by facility staff. His niece and POA Kyara Merino will pick him up at discharge. The patient denied having or needing any DME or other community resources at this time. The patient will discharge home to the facility with his niece and REYNADONNA Sparrow. He denied having any further questions or concerns at this time. CM will follow for further needs. Nneka Bullard RN    Final Discharge Disposition Code 01 - home or self-care                   Discharge Codes    No documentation.                 Expected Discharge Date and Time       Expected Discharge Date Expected Discharge Time    Oct 24, 2023               Nneka Bullard RN

## 2023-10-24 NOTE — CASE MANAGEMENT/SOCIAL WORK
Discharge Planning Assessment  [facility]     Patient Name: Jonnathan Trejo  MRN: [id number]  Today's Date: 10/24/2023    Admit Date: 10/24/2023        Discharge Needs Assessment       Row Name 10/24/23 1733       Living Environment    People in Home facility resident    Current Living Arrangements assisted living facility;other (see comments)  personal care [facility]    Potentially Unsafe Housing Conditions none    In the past 12 months has the electric, gas, oil, or water company threatened to shut off services in your home? No    Primary Care Provided by self;other (see comments)  personal care [facility]    Provides Primary Care For no one    Family Caregiver if Needed other relative(s)    Family Caregiver Names Kyara Merino (niece)    Quality of Family Relationships helpful;involved;supportive    Able to Return to Prior Arrangements yes       Resource/Environmental Concerns    Resource/Environmental Concerns none    Transportation Concerns none       Food Insecurity    Within the past 12 months, you worried that your food would run out before you got the money to buy more. Never true    Within the past 12 months, the food you bought just didn't last and you didn't have money to get more. Never true       Transition Planning    Patient/Family Anticipates Transition to home;long-term care facility    Patient/Family Anticipated Services at Transition none    Transportation Anticipated family or friend will provide       Discharge Needs Assessment    Current Outpatient/Agency/Support Group assisted living facility;skilled nursing facility    Equipment Currently Used at Home rollator    Concerns to be Addressed no discharge needs identified;denies needs/concerns at this time    Anticipated Changes Related to Illness none                   Discharge Plan    No documentation.                 Continued Care and Services - Discharged on 10/24/2023 Admission date: 10/24/2023 - Discharge disposition: Home or  Self Care   Coordination has not been started for this encounter.       Expected Discharge Date and Time       Expected Discharge Date Expected Discharge Time    Oct 24, 2023            Demographic Summary    No documentation.                  Functional Status    No documentation.                  Psychosocial    No documentation.                  Abuse/Neglect    No documentation.                  Legal    No documentation.                  Substance Abuse    No documentation.                  Patient Forms    No documentation.                     Nneka Bullard RN

## 2023-10-24 NOTE — PLAN OF CARE
Goal Outcome Evaluation:              Outcome Evaluation: patient discharging back to [facility] independent living. patient and poa received dc instructions, verbalized understanding, and denied having questions.

## 2023-10-24 NOTE — ED NOTES
CONOR Ramos, removed pt's cervical collar. Provider states as long as pt is in bed, he can have the C-Collar off.

## 2023-10-24 NOTE — ED NOTES
"Pt states he does not want to stay here and he wants a different group a doctors that he has confidence in. Pt educated on the risks of getting paralyzed without proper care of neck fractures. Pt states \"I have no pain, I don't have neck problems\".   "